# Patient Record
Sex: FEMALE | Race: WHITE | Employment: UNEMPLOYED | URBAN - METROPOLITAN AREA
[De-identification: names, ages, dates, MRNs, and addresses within clinical notes are randomized per-mention and may not be internally consistent; named-entity substitution may affect disease eponyms.]

---

## 2018-04-02 ENCOUNTER — OFFICE VISIT (OUTPATIENT)
Dept: FAMILY MEDICINE CLINIC | Age: 23
End: 2018-04-02
Payer: COMMERCIAL

## 2018-04-02 ENCOUNTER — HOSPITAL ENCOUNTER (OUTPATIENT)
Age: 23
Discharge: HOME | End: 2018-04-04
Payer: COMMERCIAL

## 2018-04-02 VITALS
DIASTOLIC BLOOD PRESSURE: 60 MMHG | HEART RATE: 101 BPM | OXYGEN SATURATION: 97 % | BODY MASS INDEX: 22.84 KG/M2 | TEMPERATURE: 98.4 F | RESPIRATION RATE: 16 BRPM | WEIGHT: 131 LBS | SYSTOLIC BLOOD PRESSURE: 120 MMHG

## 2018-04-02 DIAGNOSIS — R53.83 FATIGUE, UNSPECIFIED TYPE: ICD-10-CM

## 2018-04-02 DIAGNOSIS — J30.89 CHRONIC NON-SEASONAL ALLERGIC RHINITIS, UNSPECIFIED TRIGGER: ICD-10-CM

## 2018-04-02 DIAGNOSIS — R07.0 THROAT PAIN: Primary | ICD-10-CM

## 2018-04-02 LAB — S PYO AG THROAT QL: NORMAL

## 2018-04-02 PROCEDURE — 84443 ASSAY THYROID STIM HORMONE: CPT

## 2018-04-02 PROCEDURE — 87880 STREP A ASSAY W/OPTIC: CPT | Performed by: FAMILY MEDICINE

## 2018-04-02 PROCEDURE — 36415 COLL VENOUS BLD VENIPUNCTURE: CPT | Performed by: FAMILY MEDICINE

## 2018-04-02 PROCEDURE — 99213 OFFICE O/P EST LOW 20 MIN: CPT | Performed by: FAMILY MEDICINE

## 2018-04-02 PROCEDURE — G8420 CALC BMI NORM PARAMETERS: HCPCS | Performed by: FAMILY MEDICINE

## 2018-04-02 PROCEDURE — G8427 DOCREV CUR MEDS BY ELIG CLIN: HCPCS | Performed by: FAMILY MEDICINE

## 2018-04-02 PROCEDURE — 84439 ASSAY OF FREE THYROXINE: CPT

## 2018-04-02 PROCEDURE — 4004F PT TOBACCO SCREEN RCVD TLK: CPT | Performed by: FAMILY MEDICINE

## 2018-04-02 RX ORDER — FLUTICASONE PROPIONATE 50 MCG
1 SPRAY, SUSPENSION (ML) NASAL DAILY
Qty: 1 BOTTLE | Refills: 3 | Status: SHIPPED | OUTPATIENT
Start: 2018-04-02 | End: 2018-12-27 | Stop reason: ALTCHOICE

## 2018-04-02 RX ORDER — CETIRIZINE HYDROCHLORIDE 10 MG/1
10 TABLET ORAL DAILY
COMMUNITY
End: 2018-04-02 | Stop reason: SDUPTHER

## 2018-04-02 RX ORDER — CETIRIZINE HYDROCHLORIDE 10 MG/1
10 TABLET ORAL DAILY
Qty: 30 TABLET | Refills: 2 | Status: SHIPPED | OUTPATIENT
Start: 2018-04-02 | End: 2018-07-12

## 2018-04-02 ASSESSMENT — ENCOUNTER SYMPTOMS
ABDOMINAL PAIN: 0
DIARRHEA: 0
SORE THROAT: 1
SINUS PRESSURE: 0
WHEEZING: 0
SHORTNESS OF BREATH: 0
VOMITING: 1
RHINORRHEA: 1
NAUSEA: 1
BACK PAIN: 0
COUGH: 1
CONSTIPATION: 0

## 2018-04-03 LAB
T4 FREE: 1.2 NG/DL (ref 0.93–1.7)
TSH SERPL DL<=0.05 MIU/L-ACNC: 0.8 UIU/ML (ref 0.27–4.2)

## 2018-04-04 ENCOUNTER — TELEPHONE (OUTPATIENT)
Dept: FAMILY MEDICINE CLINIC | Age: 23
End: 2018-04-04

## 2018-04-04 RX ORDER — DIPHENHYDRAMINE HCL 25 MG
25 CAPSULE ORAL EVERY 4 HOURS PRN
Qty: 30 CAPSULE | Refills: 0 | Status: SHIPPED | OUTPATIENT
Start: 2018-04-04 | End: 2018-04-14

## 2018-07-12 ENCOUNTER — OFFICE VISIT (OUTPATIENT)
Dept: FAMILY MEDICINE CLINIC | Age: 23
End: 2018-07-12
Payer: COMMERCIAL

## 2018-07-12 VITALS
OXYGEN SATURATION: 98 % | SYSTOLIC BLOOD PRESSURE: 106 MMHG | HEIGHT: 64 IN | DIASTOLIC BLOOD PRESSURE: 64 MMHG | HEART RATE: 85 BPM | WEIGHT: 139 LBS | BODY MASS INDEX: 23.73 KG/M2

## 2018-07-12 DIAGNOSIS — F41.0 SEVERE ANXIETY WITH PANIC: Primary | ICD-10-CM

## 2018-07-12 PROCEDURE — G8427 DOCREV CUR MEDS BY ELIG CLIN: HCPCS | Performed by: FAMILY MEDICINE

## 2018-07-12 PROCEDURE — 99214 OFFICE O/P EST MOD 30 MIN: CPT | Performed by: FAMILY MEDICINE

## 2018-07-12 PROCEDURE — G8420 CALC BMI NORM PARAMETERS: HCPCS | Performed by: FAMILY MEDICINE

## 2018-07-12 PROCEDURE — 1036F TOBACCO NON-USER: CPT | Performed by: FAMILY MEDICINE

## 2018-07-12 RX ORDER — BUSPIRONE HYDROCHLORIDE 5 MG/1
5 TABLET ORAL 2 TIMES DAILY
Qty: 60 TABLET | Refills: 1 | Status: SHIPPED | OUTPATIENT
Start: 2018-07-12 | End: 2018-07-25 | Stop reason: SDUPTHER

## 2018-07-12 RX ORDER — NORGESTREL-ETHINYL ESTRADIOL 0.3-0.03MG
1 TABLET ORAL DAILY
Refills: 11 | COMMUNITY
Start: 2018-06-21 | End: 2018-12-27 | Stop reason: ALTCHOICE

## 2018-07-12 ASSESSMENT — ENCOUNTER SYMPTOMS
DIARRHEA: 0
VOMITING: 0
ABDOMINAL PAIN: 0
CONSTIPATION: 0
SORE THROAT: 0
SHORTNESS OF BREATH: 0
WHEEZING: 0
BACK PAIN: 0
RHINORRHEA: 0
SINUS PRESSURE: 0
NAUSEA: 0
COUGH: 0

## 2018-07-12 NOTE — PATIENT INSTRUCTIONS
Patient Education          buspirone  Pronunciation:  william SANTOSCOURTNEY urias  Brand: BuSpar  What is the most important information I should know about buspirone? Do not use buspirone if you have taken an MAO inhibitor in the past 14 days. A dangerous drug interaction could occur. MAO inhibitors include isocarboxazid, linezolid, methylene blue injection, phenelzine, rasagiline, selegiline, and tranylcypromine. What is buspirone? Buspirone is an anti-anxiety medicine that affects chemicals in the brain that may be unbalanced in people with anxiety. Buspirone is used to treat symptoms of anxiety, such as fear, tension, irritability, dizziness, pounding heartbeat, and other physical symptoms. Buspirone is not an anti-psychotic medication and should not be used in place of medication prescribed by your doctor for mental illness. Buspirone may also be used for purposes not listed in this medication guide. What should I discuss with my healthcare provider before taking buspirone? You should not use buspirone if you are allergic to it. Do not use buspirone if you have taken an MAO inhibitor in the past 14 days. A dangerous drug interaction could occur. MAO inhibitors include isocarboxazid, linezolid, methylene blue injection, phenelzine, rasagiline, selegiline, and tranylcypromine. To make sure buspirone is safe for you, tell your doctor if you have any of these conditions:  · kidney disease; or  · liver disease. Buspirone is not expected to harm an unborn baby. Tell your doctor if you are pregnant or plan to become pregnant during treatment. It is not known whether buspirone passes into breast milk or if it could harm a nursing baby. Tell your doctor if you are breast-feeding a baby. Buspirone is not approved for use by anyone younger than 25years old. How should I take buspirone? Follow all directions on your prescription label. Your doctor may occasionally change your dose to make sure you get the best results. Do not take this medicine in larger or smaller amounts or for longer than recommended. You may take buspirone with or without food but take it the same way each time. Some buspirone tablets are scored so you can break the tablet into 2 or 3 pieces in order to take a smaller amount of the medicine at each dose. Do not use a buspirone tablet if it has not been broken correctly and the piece is too big or too small. Follow your doctor's instructions about how much of the tablet to take. If you have switched to buspirone from another anxiety medication, you may need to slowly decrease your dose of the other medication rather than stopping suddenly. Some anxiety medications can cause withdrawal symptoms when you stop taking them suddenly after long-term use. Buspirone can cause false positive results with certain medical tests. You may need to stop using the medicine for at least 48 hours before your test. Tell any doctor who treats you that you are using buspirone. Store at room temperature away from moisture, heat, and light. What happens if I miss a dose? Take the missed dose as soon as you remember. Skip the missed dose if it is almost time for your next scheduled dose. Do not take extra medicine to make up the missed dose. What happens if I overdose? Seek emergency medical attention or call the Poison Help line at 1-951.287.5033. What should I avoid while taking buspirone? This medication may impair your thinking or reactions. Be careful if you drive or do anything that requires you to be alert. Drinking alcohol may increase certain side effects of buspirone. Grapefruit and grapefruit juice may interact with buspirone and lead to unwanted side effects. Discuss the use of grapefruit products with your doctor. What are the possible side effects of buspirone?   Get emergency medical help if you have signs of an allergic reaction: hives; difficult breathing; swelling of your face, lips, tongue, or
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
## 2018-07-25 ENCOUNTER — OFFICE VISIT (OUTPATIENT)
Dept: FAMILY MEDICINE CLINIC | Age: 23
End: 2018-07-25
Payer: COMMERCIAL

## 2018-07-25 VITALS
HEART RATE: 86 BPM | WEIGHT: 134.8 LBS | SYSTOLIC BLOOD PRESSURE: 98 MMHG | HEIGHT: 64 IN | TEMPERATURE: 98.1 F | OXYGEN SATURATION: 98 % | DIASTOLIC BLOOD PRESSURE: 64 MMHG | BODY MASS INDEX: 23.01 KG/M2

## 2018-07-25 DIAGNOSIS — J30.81 ACUTE ALLERGIC RHINITIS DUE TO ANIMAL HAIR AND DANDER: ICD-10-CM

## 2018-07-25 DIAGNOSIS — F41.0 SEVERE ANXIETY WITH PANIC: Primary | ICD-10-CM

## 2018-07-25 PROCEDURE — G8427 DOCREV CUR MEDS BY ELIG CLIN: HCPCS | Performed by: FAMILY MEDICINE

## 2018-07-25 PROCEDURE — 99214 OFFICE O/P EST MOD 30 MIN: CPT | Performed by: FAMILY MEDICINE

## 2018-07-25 PROCEDURE — 1036F TOBACCO NON-USER: CPT | Performed by: FAMILY MEDICINE

## 2018-07-25 PROCEDURE — G8420 CALC BMI NORM PARAMETERS: HCPCS | Performed by: FAMILY MEDICINE

## 2018-07-25 RX ORDER — BUSPIRONE HYDROCHLORIDE 7.5 MG/1
7.5 TABLET ORAL 2 TIMES DAILY
Qty: 60 TABLET | Refills: 1 | Status: SHIPPED | OUTPATIENT
Start: 2018-07-25 | End: 2018-08-15 | Stop reason: SDUPTHER

## 2018-07-25 RX ORDER — FEXOFENADINE HYDROCHLORIDE 60 MG/1
60 TABLET, FILM COATED ORAL DAILY
Qty: 30 TABLET | Refills: 1 | Status: SHIPPED | OUTPATIENT
Start: 2018-07-25 | End: 2018-08-15 | Stop reason: SDUPTHER

## 2018-07-25 RX ORDER — DIPHENHYDRAMINE HCL 25 MG
25 CAPSULE ORAL EVERY 4 HOURS PRN
Qty: 60 CAPSULE | Refills: 1 | Status: SHIPPED | OUTPATIENT
Start: 2018-07-25 | End: 2018-12-27 | Stop reason: ALTCHOICE

## 2018-07-25 ASSESSMENT — ENCOUNTER SYMPTOMS
NAUSEA: 0
DIARRHEA: 0
COUGH: 0
SHORTNESS OF BREATH: 0
SINUS PRESSURE: 0
WHEEZING: 0
BACK PAIN: 0
VOMITING: 0
SORE THROAT: 1
CONSTIPATION: 0
ABDOMINAL PAIN: 0
RHINORRHEA: 1

## 2018-07-25 NOTE — PATIENT INSTRUCTIONS
Patient Education          fexofenadine  Pronunciation:  FEX oh ARCHIE garner  Brand:  Allegra, Allegra 24 Hour Allergy, Allegra Allergy, Allegra ODT, Aller-Ease, Children's Allegra Allergy, Children's Allegra ODT  What is the most important information I should know about fexofenadine? Ask a doctor or pharmacist before taking this medicine if you are allergic to any drugs, or if you have kidney disease. Do not take fexofenadine with fruit juice (such as apple, orange, or grapefruit). Take the disintegrating tablet on an empty stomach, at least 1 hour before or 2 hours after a meal.  There may be other drugs that can affect fexofenadine. Tell your doctor about all medications you use. This includes prescription, over-the-counter, vitamin, and herbal products. Do not start a new medication without telling your doctor. Stop using this medication and get emergency medical help if you think you have used too much medicine, or if you have any of these signs of an allergic reaction: hives; difficulty breathing; swelling of your face, lips, tongue, or throat. Tell your doctor if your symptoms do not improve, or if they get worse after you start taking fexofenadine. Never use more than the recommended dose. What is fexofenadine? Fexofenadine is an antihistamine that reduces the effects of natural chemical histamine in the body. Histamine can produce symptoms of sneezing, itching, watery eyes, and runny nose. Fexofenadine is used to treat the symptoms of seasonal allergies (hay fever) in adults and children. Fexofenadine is also used to treat skin itching and hives caused by a condition called chronic idiopathic urticaria in adults and children. Fexofenadine may also be used for purposes not listed in this medication guide. What should I discuss with my healthcare provider before taking fexofenadine?   Ask a doctor or pharmacist if it is safe for you to take this medicine if you are allergic to any drugs, or if you practitioners. The absence of a warning for a given drug or drug combination in no way should be construed to indicate that the drug or drug combination is safe, effective or appropriate for any given patient. [facility] does not assume any responsibility for any aspect of healthcare administered with the aid of information [facility] provides. The information contained herein is not intended to cover all possible uses, directions, precautions, warnings, drug interactions, allergic reactions, or adverse effects. If you have questions about the drugs you are taking, check with your doctor, nurse or pharmacist.  Copyright 5368-0710 [address]: 10.02. Revision date: 2/22/2012. Care instructions adapted under license by [facility] ([facility]). If you have questions about a medical condition or this instruction, always ask your healthcare professional. [place] [address] any warranty or liability for your use of this information.

## 2018-07-25 NOTE — PROGRESS NOTES
Patient is a 24 y.o. female presenting today for follow up of anxiety. Anxiety has gotten a little bit better. She is buspar 5 mg BID. She follows with psychiatry on Monday. She is unsure if she is seeing the psychiatrist or a counselor on Monday. She states that her throat has been feeling like it is itchy and swelling. She is unsure if it is from the buspar or her cats. She states that this is only occurring when she is staying at her house. She has been waking up with a really stuffy nose. She is taking flonase as prescribed. She states that she has always had cats and never had a problem. Patient's past medical, surgical, social and/or family history reviewed, updated in chart, and are non-contributory (unless otherwise stated). Medications and allergies also reviewed and updated in chart. BP 98/64   Pulse 86   Temp 98.1 °F (36.7 °C) (Oral)   Ht 5' 3.5\" (1.613 m)   Wt 134 lb 12.8 oz (61.1 kg)   LMP 07/18/2018   SpO2 98%   Breastfeeding? No   BMI 23.50 kg/m²     Review of Systems   Constitutional: Negative for chills, fatigue and fever. HENT: Positive for congestion, rhinorrhea and sore throat. Negative for ear discharge, ear pain, postnasal drip, sinus pressure and sneezing. Respiratory: Negative for cough, shortness of breath and wheezing. Cardiovascular: Negative for chest pain, palpitations and leg swelling. Gastrointestinal: Negative for abdominal pain, constipation, diarrhea, nausea and vomiting. Genitourinary: Negative for dysuria, frequency and hematuria. Musculoskeletal: Negative for arthralgias, back pain and myalgias. Skin: Negative for rash. Neurological: Negative for dizziness, light-headedness and headaches. Psychiatric/Behavioral: Positive for agitation and sleep disturbance. Negative for self-injury and suicidal ideas. The patient is nervous/anxious. Physical Exam   Constitutional: She is oriented to person, place, and time.  She appears and/or home exercises printed for patient's review and were included in patient instructions on his/her After Visit Summary and given to patient at the end of visit. Counseled regarding above diagnosis, including possible risks and complications,  especially if left uncontrolled. Counseled regarding the possible side effects, risks, benefits and alternatives to treatment; patient and/or guardian verbalizes understanding, agrees, feels comfortable with and wishes to proceed with above treatment plan. Advised patient to call with any new medication issues, and read all Rx info from pharmacy to assure aware of all possible risks and side effects of medication before taking. Reviewed age and gender appropriate health screening exams and vaccinations. Advised patient regarding importance of keeping up with recommended health maintenance and to schedule as soon as possible if overdue, as this is important in assessing for undiagnosed pathology, especially cancer, as well as protecting against potentially harmful/life threatening disease. Patient and/or guardian verbalizes understanding and agrees with above counseling, assessment and plan. All questions answered.

## 2018-08-15 ENCOUNTER — OFFICE VISIT (OUTPATIENT)
Dept: FAMILY MEDICINE CLINIC | Age: 23
End: 2018-08-15
Payer: COMMERCIAL

## 2018-08-15 VITALS
DIASTOLIC BLOOD PRESSURE: 64 MMHG | HEIGHT: 64 IN | HEART RATE: 92 BPM | OXYGEN SATURATION: 98 % | BODY MASS INDEX: 23.93 KG/M2 | SYSTOLIC BLOOD PRESSURE: 106 MMHG | WEIGHT: 140.2 LBS

## 2018-08-15 DIAGNOSIS — F41.0 SEVERE ANXIETY WITH PANIC: ICD-10-CM

## 2018-08-15 DIAGNOSIS — J30.81 ACUTE ALLERGIC RHINITIS DUE TO ANIMAL HAIR AND DANDER: Primary | ICD-10-CM

## 2018-08-15 PROCEDURE — 1036F TOBACCO NON-USER: CPT | Performed by: FAMILY MEDICINE

## 2018-08-15 PROCEDURE — 99213 OFFICE O/P EST LOW 20 MIN: CPT | Performed by: FAMILY MEDICINE

## 2018-08-15 PROCEDURE — G8427 DOCREV CUR MEDS BY ELIG CLIN: HCPCS | Performed by: FAMILY MEDICINE

## 2018-08-15 PROCEDURE — G8420 CALC BMI NORM PARAMETERS: HCPCS | Performed by: FAMILY MEDICINE

## 2018-08-15 RX ORDER — BUSPIRONE HYDROCHLORIDE 7.5 MG/1
7.5 TABLET ORAL 2 TIMES DAILY
Qty: 60 TABLET | Refills: 3 | Status: SHIPPED | OUTPATIENT
Start: 2018-08-15 | End: 2019-01-03 | Stop reason: SDUPTHER

## 2018-08-15 RX ORDER — FEXOFENADINE HCL 180 MG/1
180 TABLET ORAL DAILY
Qty: 30 TABLET | Refills: 1 | Status: SHIPPED | OUTPATIENT
Start: 2018-08-15 | End: 2018-12-27 | Stop reason: ALTCHOICE

## 2018-08-15 ASSESSMENT — ENCOUNTER SYMPTOMS
NAUSEA: 0
ABDOMINAL PAIN: 0
DIARRHEA: 0
SORE THROAT: 0
SHORTNESS OF BREATH: 0
BACK PAIN: 0
CONSTIPATION: 0
RHINORRHEA: 0
SINUS PRESSURE: 0
WHEEZING: 0
COUGH: 0
VOMITING: 0

## 2018-08-15 NOTE — PATIENT INSTRUCTIONS
have kidney disease. FDA pregnancy category C. It is not known whether fexofenadine will harm an unborn baby. Tell your doctor if you are pregnant or plan to become pregnant while using this medication. It is not known whether fexofenadine passes into breast milk or if it could harm a nursing baby. Do not use this medication without telling your doctor if you are breast-feeding a baby. Fexofenadine tablets  and capsules  may be used to treat seasonal allergy symptoms in children who are at least 10years old. Fexofenadine oral suspension (liquid) may be used in children ages 2 through 6. When treating chronic idiopathic urticaria, the liquid may be used in children as young as 2 months old. Do not give this medicine to a child without medical advice. How should I take fexofenadine? Use exactly as directed on the label, or as prescribed by your doctor. Do not use in larger or smaller amounts or for longer than recommended. Take the regular tablet  with a full glass of water. Do not use any other type of liquid. Shake the oral suspension (liquid) well just before you measure a dose. To be sure you get the correct dose, measure the liquid with a marked measuring spoon or medicine cup, not with a regular table spoon. If you do not have a dose-measuring device, ask your pharmacist for one. To take fexofenadine orally disintegrating tablets (Allegra ODT):  · Keep the tablet in its blister pack until you are ready to take the medicine. Open the package and peel back the foil from the tablet blister. Do not push a tablet through the foil or you may damage the tablet. · Using dry hands, remove the tablet and place it in your mouth. It will begin to dissolve right away. · Do not swallow the tablet whole. Allow it to dissolve in your mouth without chewing. · Swallow several times as the tablet dissolves. If desired, you may drink liquid to help swallow the dissolved tablet.   Take the disintegrating tablet on an

## 2018-08-15 NOTE — PROGRESS NOTES
Patient is a 24 y.o. female presenting today for follow up of allergies. She states that she is still congested but it is more tolerable than it was before. They have not gotten rid of all of the cats. She is taking allegra as prescribed. She is going to take the cats to a farm. Anxiety: anxiety is also getting better. She saw a counselor and they want her to start doing exposure therapy where she makes herself drive more. She is taking buspar 5 mg BID. IT does seem to be helping. Patient's past medical, surgical, social and/or family history reviewed, updated in chart, and are non-contributory (unless otherwise stated). Medications and allergies also reviewed and updated in chart. /64 (Site: Right Arm, Position: Sitting, Cuff Size: Medium Adult)   Pulse 92   Ht 5' 3.5\" (1.613 m)   Wt 140 lb 3.2 oz (63.6 kg)   LMP 08/15/2018   SpO2 98%   BMI 24.45 kg/m²     Review of Systems   Constitutional: Negative for chills, fatigue and fever. HENT: Positive for congestion. Negative for ear discharge, ear pain, postnasal drip, rhinorrhea, sinus pressure, sneezing and sore throat. Respiratory: Negative for cough, shortness of breath and wheezing. Cardiovascular: Negative for chest pain, palpitations and leg swelling. Gastrointestinal: Negative for abdominal pain, constipation, diarrhea, nausea and vomiting. Genitourinary: Negative for dysuria, frequency and hematuria. Musculoskeletal: Negative for arthralgias, back pain and myalgias. Skin: Negative for rash. Neurological: Negative for dizziness, light-headedness and headaches. Psychiatric/Behavioral: The patient is nervous/anxious. Physical Exam   Constitutional: She is oriented to person, place, and time. She appears well-developed and well-nourished. HENT:   Head: Normocephalic and atraumatic.    Right Ear: External ear normal.   Left Ear: External ear normal.   Nose: Nose normal.   Mouth/Throat: Oropharynx is issues, and read all Rx info from pharmacy to assure aware of all possible risks and side effects of medication before taking. Reviewed age and gender appropriate health screening exams and vaccinations. Advised patient regarding importance of keeping up with recommended health maintenance and to schedule as soon as possible if overdue, as this is important in assessing for undiagnosed pathology, especially cancer, as well as protecting against potentially harmful/life threatening disease. Patient and/or guardian verbalizes understanding and agrees with above counseling, assessment and plan. All questions answered.

## 2018-10-29 ENCOUNTER — HOSPITAL ENCOUNTER (EMERGENCY)
Age: 23
Discharge: HOME | End: 2018-10-29
Attending: EMERGENCY MEDICINE
Payer: COMMERCIAL

## 2018-10-29 VITALS
WEIGHT: 140 LBS | RESPIRATION RATE: 16 BRPM | SYSTOLIC BLOOD PRESSURE: 109 MMHG | BODY MASS INDEX: 23.9 KG/M2 | HEIGHT: 64 IN | DIASTOLIC BLOOD PRESSURE: 59 MMHG | TEMPERATURE: 99.4 F | OXYGEN SATURATION: 99 % | HEART RATE: 94 BPM

## 2018-10-29 DIAGNOSIS — R10.9 LEFT FLANK PAIN: ICD-10-CM

## 2018-10-29 DIAGNOSIS — N12 PYELONEPHRITIS: Primary | ICD-10-CM

## 2018-10-29 LAB
ALBUMIN SERPL-MCNC: 4.5 G/DL (ref 3.5–5.2)
ALP BLD-CCNC: 69 U/L (ref 35–104)
ALT SERPL-CCNC: 21 U/L (ref 0–32)
ANION GAP SERPL CALCULATED.3IONS-SCNC: 14 MMOL/L (ref 7–16)
AST SERPL-CCNC: 24 U/L (ref 0–31)
BACTERIA: ABNORMAL /HPF
BASOPHILS ABSOLUTE: 0.02 E9/L (ref 0–0.2)
BASOPHILS RELATIVE PERCENT: 0.2 % (ref 0–2)
BILIRUB SERPL-MCNC: 0.3 MG/DL (ref 0–1.2)
BILIRUBIN URINE: NEGATIVE
BLOOD, URINE: ABNORMAL
BUN BLDV-MCNC: 7 MG/DL (ref 6–20)
CALCIUM SERPL-MCNC: 9.1 MG/DL (ref 8.6–10.2)
CHLORIDE BLD-SCNC: 100 MMOL/L (ref 98–107)
CHP ED QC CHECK: YES
CLARITY: ABNORMAL
CO2: 21 MMOL/L (ref 22–29)
COLOR: YELLOW
CREAT SERPL-MCNC: 0.7 MG/DL (ref 0.5–1)
EOSINOPHILS ABSOLUTE: 0.05 E9/L (ref 0.05–0.5)
EOSINOPHILS RELATIVE PERCENT: 0.6 % (ref 0–6)
GFR AFRICAN AMERICAN: >60
GFR NON-AFRICAN AMERICAN: >60 ML/MIN/1.73
GLUCOSE BLD-MCNC: 94 MG/DL (ref 74–109)
GLUCOSE URINE: NEGATIVE MG/DL
HCT VFR BLD CALC: 36 % (ref 34–48)
HEMOGLOBIN: 12.4 G/DL (ref 11.5–15.5)
IMMATURE GRANULOCYTES #: 0.02 E9/L
IMMATURE GRANULOCYTES %: 0.2 % (ref 0–5)
KETONES, URINE: NEGATIVE MG/DL
LACTIC ACID: 0.9 MMOL/L (ref 0.5–2.2)
LEUKOCYTE ESTERASE, URINE: ABNORMAL
LIPASE: 19 U/L (ref 13–60)
LYMPHOCYTES ABSOLUTE: 1.91 E9/L (ref 1.5–4)
LYMPHOCYTES RELATIVE PERCENT: 22.9 % (ref 20–42)
MCH RBC QN AUTO: 28.4 PG (ref 26–35)
MCHC RBC AUTO-ENTMCNC: 34.4 % (ref 32–34.5)
MCV RBC AUTO: 82.4 FL (ref 80–99.9)
MONOCYTES ABSOLUTE: 0.39 E9/L (ref 0.1–0.95)
MONOCYTES RELATIVE PERCENT: 4.7 % (ref 2–12)
NEUTROPHILS ABSOLUTE: 5.95 E9/L (ref 1.8–7.3)
NEUTROPHILS RELATIVE PERCENT: 71.4 % (ref 43–80)
NITRITE, URINE: POSITIVE
PDW BLD-RTO: 13.1 FL (ref 11.5–15)
PH UA: 6 (ref 5–9)
PLATELET # BLD: 198 E9/L (ref 130–450)
PMV BLD AUTO: 9.6 FL (ref 7–12)
POTASSIUM SERPL-SCNC: 4.1 MMOL/L (ref 3.5–5)
PREGNANCY TEST URINE, POC: NEGATIVE
PROTEIN UA: ABNORMAL MG/DL
RBC # BLD: 4.37 E12/L (ref 3.5–5.5)
RBC UA: ABNORMAL /HPF (ref 0–2)
SODIUM BLD-SCNC: 135 MMOL/L (ref 132–146)
SPECIFIC GRAVITY UA: 1.02 (ref 1–1.03)
TOTAL PROTEIN: 7.6 G/DL (ref 6.4–8.3)
UROBILINOGEN, URINE: 0.2 E.U./DL
WBC # BLD: 8.3 E9/L (ref 4.5–11.5)
WBC UA: ABNORMAL /HPF (ref 0–5)

## 2018-10-29 PROCEDURE — 99284 EMERGENCY DEPT VISIT MOD MDM: CPT

## 2018-10-29 PROCEDURE — 81001 URINALYSIS AUTO W/SCOPE: CPT

## 2018-10-29 PROCEDURE — 87077 CULTURE AEROBIC IDENTIFY: CPT

## 2018-10-29 PROCEDURE — 80053 COMPREHEN METABOLIC PANEL: CPT

## 2018-10-29 PROCEDURE — 87040 BLOOD CULTURE FOR BACTERIA: CPT

## 2018-10-29 PROCEDURE — 96365 THER/PROPH/DIAG IV INF INIT: CPT

## 2018-10-29 PROCEDURE — 2580000003 HC RX 258: Performed by: EMERGENCY MEDICINE

## 2018-10-29 PROCEDURE — 83690 ASSAY OF LIPASE: CPT

## 2018-10-29 PROCEDURE — 87088 URINE BACTERIA CULTURE: CPT

## 2018-10-29 PROCEDURE — 87186 SC STD MICRODIL/AGAR DIL: CPT

## 2018-10-29 PROCEDURE — 6360000002 HC RX W HCPCS: Performed by: EMERGENCY MEDICINE

## 2018-10-29 PROCEDURE — 96375 TX/PRO/DX INJ NEW DRUG ADDON: CPT

## 2018-10-29 PROCEDURE — 85025 COMPLETE CBC W/AUTO DIFF WBC: CPT

## 2018-10-29 PROCEDURE — 83605 ASSAY OF LACTIC ACID: CPT

## 2018-10-29 RX ORDER — NAPROXEN 500 MG/1
500 TABLET ORAL 2 TIMES DAILY
Qty: 14 TABLET | Refills: 0 | Status: SHIPPED | OUTPATIENT
Start: 2018-10-29 | End: 2018-12-27 | Stop reason: ALTCHOICE

## 2018-10-29 RX ORDER — 0.9 % SODIUM CHLORIDE 0.9 %
1000 INTRAVENOUS SOLUTION INTRAVENOUS ONCE
Status: COMPLETED | OUTPATIENT
Start: 2018-10-29 | End: 2018-10-29

## 2018-10-29 RX ORDER — ONDANSETRON 2 MG/ML
8 INJECTION INTRAMUSCULAR; INTRAVENOUS ONCE
Status: COMPLETED | OUTPATIENT
Start: 2018-10-29 | End: 2018-10-29

## 2018-10-29 RX ORDER — HYDROCODONE BITARTRATE AND ACETAMINOPHEN 5; 325 MG/1; MG/1
1 TABLET ORAL EVERY 6 HOURS PRN
Qty: 10 TABLET | Refills: 0 | Status: SHIPPED | OUTPATIENT
Start: 2018-10-29 | End: 2018-11-01

## 2018-10-29 RX ORDER — CEFDINIR 300 MG/1
300 CAPSULE ORAL 2 TIMES DAILY
Qty: 20 CAPSULE | Refills: 0 | Status: SHIPPED | OUTPATIENT
Start: 2018-10-29 | End: 2018-11-08

## 2018-10-29 RX ORDER — SODIUM CHLORIDE 0.9 % (FLUSH) 0.9 %
10 SYRINGE (ML) INJECTION PRN
Status: DISCONTINUED | OUTPATIENT
Start: 2018-10-29 | End: 2018-10-29 | Stop reason: HOSPADM

## 2018-10-29 RX ORDER — KETOROLAC TROMETHAMINE 30 MG/ML
15 INJECTION, SOLUTION INTRAMUSCULAR; INTRAVENOUS ONCE
Status: COMPLETED | OUTPATIENT
Start: 2018-10-29 | End: 2018-10-29

## 2018-10-29 RX ADMIN — KETOROLAC TROMETHAMINE 15 MG: 30 INJECTION, SOLUTION INTRAMUSCULAR at 09:32

## 2018-10-29 RX ADMIN — SODIUM CHLORIDE 1000 ML: 9 INJECTION, SOLUTION INTRAVENOUS at 09:32

## 2018-10-29 RX ADMIN — CEFTRIAXONE SODIUM 2 G: 2 INJECTION, POWDER, FOR SOLUTION INTRAMUSCULAR; INTRAVENOUS at 09:32

## 2018-10-29 RX ADMIN — ONDANSETRON 8 MG: 2 INJECTION INTRAMUSCULAR; INTRAVENOUS at 09:32

## 2018-10-29 ASSESSMENT — PAIN SCALES - GENERAL: PAINLEVEL_OUTOF10: 10

## 2018-10-29 ASSESSMENT — PAIN DESCRIPTION - LOCATION: LOCATION: FLANK

## 2018-10-29 ASSESSMENT — ENCOUNTER SYMPTOMS
SHORTNESS OF BREATH: 0
VOMITING: 1
BACK PAIN: 0
COUGH: 0
BLOOD IN STOOL: 0
ABDOMINAL PAIN: 0
NAUSEA: 1

## 2018-10-29 ASSESSMENT — PAIN DESCRIPTION - PAIN TYPE: TYPE: ACUTE PAIN

## 2018-10-29 NOTE — ED PROVIDER NOTES
65-year-old female presents to the emergency department with dysuria and left flank pain that started approximately 2 weeks ago and has continued to worsen to the point today where she could not tolerate anymore. Patient states Dr. valverde who thought it might be a urinary tract infection. She states subjective fevers at home has also had some nausea and vomiting over the last 2 days. She denies any other symptoms including vaginal bleeding vaginal discharge chest pain shortness of breath or other symptoms. The history is provided by the patient. Dysuria    This is a new problem. The current episode started more than 1 week ago. The problem occurs every urination. The problem has not changed since onset. The quality of the pain is described as burning. The pain is at a severity of 6/10. The pain is moderate. There has been no fever. The fever has been present for less than 1 day. Associated symptoms include chills, nausea, vomiting, urgency and flank pain. Pertinent negatives include no frequency. She has tried nothing for the symptoms. Review of Systems   Constitutional: Positive for chills. Negative for fever. Respiratory: Negative for cough and shortness of breath. Cardiovascular: Negative for chest pain. Gastrointestinal: Positive for nausea and vomiting. Negative for abdominal pain and blood in stool. Genitourinary: Positive for dysuria, flank pain and urgency. Negative for frequency. Musculoskeletal: Negative for back pain. Skin: Negative for rash. Neurological: Negative for weakness and headaches. All other systems reviewed and are negative. Physical Exam   Constitutional: She is oriented to person, place, and time. She appears well-developed and well-nourished. She appears ill. HENT:   Head: Normocephalic and atraumatic. Eyes: Pupils are equal, round, and reactive to light. EOM are normal.   Neck: Normal range of motion. Neck supple. Cardiovascular: Regular rhythm.

## 2018-10-31 LAB
ORGANISM: ABNORMAL
URINE CULTURE, ROUTINE: ABNORMAL
URINE CULTURE, ROUTINE: ABNORMAL

## 2018-11-03 LAB
BLOOD CULTURE, ROUTINE: NORMAL
CULTURE, BLOOD 2: NORMAL

## 2018-12-15 ENCOUNTER — HOSPITAL ENCOUNTER (EMERGENCY)
Age: 23
Discharge: HOME | End: 2018-12-15
Attending: EMERGENCY MEDICINE
Payer: COMMERCIAL

## 2018-12-15 ENCOUNTER — APPOINTMENT (OUTPATIENT)
Dept: ULTRASOUND IMAGING | Age: 23
End: 2018-12-15
Payer: COMMERCIAL

## 2018-12-15 VITALS
HEIGHT: 63 IN | OXYGEN SATURATION: 100 % | HEART RATE: 92 BPM | TEMPERATURE: 98.8 F | BODY MASS INDEX: 24.8 KG/M2 | RESPIRATION RATE: 16 BRPM | WEIGHT: 140 LBS | DIASTOLIC BLOOD PRESSURE: 62 MMHG | SYSTOLIC BLOOD PRESSURE: 120 MMHG

## 2018-12-15 DIAGNOSIS — Z3A.01 LESS THAN 8 WEEKS GESTATION OF PREGNANCY: ICD-10-CM

## 2018-12-15 DIAGNOSIS — N89.8 VAGINAL DISCHARGE: Primary | ICD-10-CM

## 2018-12-15 LAB
BACTERIA: ABNORMAL /HPF
BASOPHILS ABSOLUTE: 0.04 E9/L (ref 0–0.2)
BASOPHILS RELATIVE PERCENT: 0.5 % (ref 0–2)
BILIRUBIN URINE: NEGATIVE
BLOOD, URINE: NEGATIVE
CLARITY: CLEAR
CLUE CELLS: NORMAL
COLOR: YELLOW
EOSINOPHILS ABSOLUTE: 0.12 E9/L (ref 0.05–0.5)
EOSINOPHILS RELATIVE PERCENT: 1.5 % (ref 0–6)
EPITHELIAL CELLS, UA: ABNORMAL /HPF
GLUCOSE URINE: NEGATIVE MG/DL
GONADOTROPIN, CHORIONIC (HCG) QUANT: ABNORMAL MIU/ML
HCT VFR BLD CALC: 34.8 % (ref 34–48)
HEMOGLOBIN: 11.8 G/DL (ref 11.5–15.5)
IMMATURE GRANULOCYTES #: 0.02 E9/L
IMMATURE GRANULOCYTES %: 0.3 % (ref 0–5)
KETONES, URINE: NEGATIVE MG/DL
LEUKOCYTE ESTERASE, URINE: ABNORMAL
LYMPHOCYTES ABSOLUTE: 3.08 E9/L (ref 1.5–4)
LYMPHOCYTES RELATIVE PERCENT: 38.8 % (ref 20–42)
MCH RBC QN AUTO: 28 PG (ref 26–35)
MCHC RBC AUTO-ENTMCNC: 33.9 % (ref 32–34.5)
MCV RBC AUTO: 82.7 FL (ref 80–99.9)
MONOCYTES ABSOLUTE: 0.29 E9/L (ref 0.1–0.95)
MONOCYTES RELATIVE PERCENT: 3.7 % (ref 2–12)
NEUTROPHILS ABSOLUTE: 4.39 E9/L (ref 1.8–7.3)
NEUTROPHILS RELATIVE PERCENT: 55.2 % (ref 43–80)
NITRITE, URINE: NEGATIVE
PDW BLD-RTO: 13.1 FL (ref 11.5–15)
PH UA: 7 (ref 5–9)
PLATELET # BLD: 220 E9/L (ref 130–450)
PMV BLD AUTO: 9.2 FL (ref 7–12)
PROTEIN UA: NEGATIVE MG/DL
RBC # BLD: 4.21 E12/L (ref 3.5–5.5)
RBC UA: ABNORMAL /HPF (ref 0–2)
SOURCE WET PREP: NORMAL
SPECIFIC GRAVITY UA: <=1.005 (ref 1–1.03)
TRICHOMONAS PREP: NORMAL
UROBILINOGEN, URINE: 0.2 E.U./DL
WBC # BLD: 7.9 E9/L (ref 4.5–11.5)
WBC UA: ABNORMAL /HPF (ref 0–5)
YEAST WET PREP: NORMAL

## 2018-12-15 PROCEDURE — 76817 TRANSVAGINAL US OBSTETRIC: CPT

## 2018-12-15 PROCEDURE — 87491 CHLMYD TRACH DNA AMP PROBE: CPT

## 2018-12-15 PROCEDURE — 84702 CHORIONIC GONADOTROPIN TEST: CPT

## 2018-12-15 PROCEDURE — 87210 SMEAR WET MOUNT SALINE/INK: CPT

## 2018-12-15 PROCEDURE — 87591 N.GONORRHOEAE DNA AMP PROB: CPT

## 2018-12-15 PROCEDURE — 81001 URINALYSIS AUTO W/SCOPE: CPT

## 2018-12-15 PROCEDURE — 99284 EMERGENCY DEPT VISIT MOD MDM: CPT

## 2018-12-15 PROCEDURE — 85025 COMPLETE CBC W/AUTO DIFF WBC: CPT

## 2018-12-15 ASSESSMENT — ENCOUNTER SYMPTOMS
ABDOMINAL PAIN: 0
VOMITING: 0
COUGH: 0
BACK PAIN: 0
NAUSEA: 0
SHORTNESS OF BREATH: 0
BLOOD IN STOOL: 0

## 2018-12-15 NOTE — ED PROVIDER NOTES
N. Gonorrhoeae Amplified       Negative for N. gonorrhoeae rRNA  Results should be interpreted in conjunction with other  laboratory and clinical data available to the clinician.      HCG, Quantitative, Pregnancy   Result Value Ref Range    hCG Quant 24413.0 (H) <10 mIU/mL   Urinalysis with Microscopic   Result Value Ref Range    Color, UA Yellow Straw/Yellow    Clarity, UA Clear Clear    Glucose, Ur Negative Negative mg/dL    Bilirubin Urine Negative Negative    Ketones, Urine Negative Negative mg/dL    Specific Gravity, UA <=1.005 1.005 - 1.030    Blood, Urine Negative Negative    pH, UA 7.0 5.0 - 9.0    Protein, UA Negative Negative mg/dL    Urobilinogen, Urine 0.2 <2.0 E.U./dL    Nitrite, Urine Negative Negative    Leukocyte Esterase, Urine MODERATE (A) Negative    WBC, UA 5-10 0 - 5 /HPF    RBC, UA 0-1 0 - 2 /HPF    Epi Cells MANY /HPF    Bacteria, UA MODERATE (A) /HPF   CBC Auto Differential   Result Value Ref Range    WBC 7.9 4.5 - 11.5 E9/L    RBC 4.21 3.50 - 5.50 E12/L    Hemoglobin 11.8 11.5 - 15.5 g/dL    Hematocrit 34.8 34.0 - 48.0 %    MCV 82.7 80.0 - 99.9 fL    MCH 28.0 26.0 - 35.0 pg    MCHC 33.9 32.0 - 34.5 %    RDW 13.1 11.5 - 15.0 fL    Platelets 670 781 - 505 E9/L    MPV 9.2 7.0 - 12.0 fL    Neutrophils % 55.2 43.0 - 80.0 %    Immature Granulocytes % 0.3 0.0 - 5.0 %    Lymphocytes % 38.8 20.0 - 42.0 %    Monocytes % 3.7 2.0 - 12.0 %    Eosinophils % 1.5 0.0 - 6.0 %    Basophils % 0.5 0.0 - 2.0 %    Neutrophils # 4.39 1.80 - 7.30 E9/L    Immature Granulocytes # 0.02 E9/L    Lymphocytes # 3.08 1.50 - 4.00 E9/L    Monocytes # 0.29 0.10 - 0.95 E9/L    Eosinophils # 0.12 0.05 - 0.50 E9/L    Basophils # 0.04 0.00 - 0.20 E9/L       Radiology:  US OB TRANSVAGINAL   Final Result   Very early single living intrauterine gestation present best estimate   gestational age 10 weeks 6 days JOSELYN 8/11/2019             ------------------------- NURSING NOTES AND VITALS REVIEWED ---------------------------  Date / Time Roomed:  12/15/2018  6:16 PM  ED Bed Assignment:  12/12    The nursing notes within the ED encounter and vital signs as below have been reviewed. /62   Pulse 92   Temp 98.8 °F (37.1 °C)   Resp 16   Ht 5' 3\" (1.6 m)   Wt 140 lb (63.5 kg)   LMP 10/01/2018   SpO2 100%   BMI 24.80 kg/m²   Oxygen Saturation Interpretation: Normal      ------------------------------------------ PROGRESS NOTES ------------------------------------------  I have spoken with the patient and discussed todays results, in addition to providing specific details for the plan of care and counseling regarding the diagnosis and prognosis. Their questions are answered at this time and they are agreeable with the plan. I discussed at length with them reasons for immediate return here for re evaluation. They will followup with primary care by calling their office tomorrow. --------------------------------- ADDITIONAL PROVIDER NOTES ---------------------------------  At this time the patient is without objective evidence of an acute process requiring hospitalization or inpatient management. They have remained hemodynamically stable throughout their entire ED visit and are stable for discharge with outpatient follow-up. The plan has been discussed in detail and they are aware of the specific conditions for emergent return, as well as the importance of follow-up. Discharge Medication List as of 12/15/2018  9:12 PM          Diagnosis:  1. Vaginal discharge    2. Less than 8 weeks gestation of pregnancy        Disposition:  Patient's disposition: Discharge to home  Patient's condition is stable.             Antony Luther,   Resident  12/19/18 [id number]

## 2018-12-19 LAB
CHLAMYDIA TRACHOMATIS AMPLIFIED DET: NORMAL
N GONORRHOEAE AMPLIFIED DET: NORMAL

## 2019-01-03 ENCOUNTER — OFFICE VISIT (OUTPATIENT)
Dept: FAMILY MEDICINE CLINIC | Age: 24
End: 2019-01-03
Payer: COMMERCIAL

## 2019-01-03 VITALS
SYSTOLIC BLOOD PRESSURE: 118 MMHG | WEIGHT: 145.6 LBS | DIASTOLIC BLOOD PRESSURE: 74 MMHG | TEMPERATURE: 98.2 F | OXYGEN SATURATION: 98 % | HEART RATE: 84 BPM | BODY MASS INDEX: 24.86 KG/M2 | HEIGHT: 64 IN

## 2019-01-03 DIAGNOSIS — Z23 NEEDS FLU SHOT: ICD-10-CM

## 2019-01-03 DIAGNOSIS — F41.0 SEVERE ANXIETY WITH PANIC: Primary | ICD-10-CM

## 2019-01-03 PROCEDURE — 1036F TOBACCO NON-USER: CPT | Performed by: FAMILY MEDICINE

## 2019-01-03 PROCEDURE — 90688 IIV4 VACCINE SPLT 0.5 ML IM: CPT | Performed by: FAMILY MEDICINE

## 2019-01-03 PROCEDURE — G8482 FLU IMMUNIZE ORDER/ADMIN: HCPCS | Performed by: FAMILY MEDICINE

## 2019-01-03 PROCEDURE — 99213 OFFICE O/P EST LOW 20 MIN: CPT | Performed by: FAMILY MEDICINE

## 2019-01-03 PROCEDURE — 90471 IMMUNIZATION ADMIN: CPT | Performed by: FAMILY MEDICINE

## 2019-01-03 PROCEDURE — G8427 DOCREV CUR MEDS BY ELIG CLIN: HCPCS | Performed by: FAMILY MEDICINE

## 2019-01-03 PROCEDURE — G8419 CALC BMI OUT NRM PARAM NOF/U: HCPCS | Performed by: FAMILY MEDICINE

## 2019-01-03 RX ORDER — ASPIRIN 81 MG/1
81 TABLET, CHEWABLE ORAL DAILY
COMMUNITY
End: 2019-07-11 | Stop reason: ALTCHOICE

## 2019-01-03 RX ORDER — BUSPIRONE HYDROCHLORIDE 7.5 MG/1
7.5 TABLET ORAL 2 TIMES DAILY
Qty: 60 TABLET | Refills: 3 | Status: SHIPPED
Start: 2019-01-03 | End: 2020-06-10 | Stop reason: SDUPTHER

## 2019-01-03 ASSESSMENT — ENCOUNTER SYMPTOMS
ABDOMINAL PAIN: 0
CONSTIPATION: 0
SHORTNESS OF BREATH: 0
COUGH: 0
SORE THROAT: 0
RHINORRHEA: 0
SINUS PRESSURE: 0
NAUSEA: 1
DIARRHEA: 0
BACK PAIN: 0
WHEEZING: 0
VOMITING: 1

## 2019-01-24 ENCOUNTER — ROUTINE PRENATAL (OUTPATIENT)
Dept: OBGYN CLINIC | Age: 24
End: 2019-01-24
Payer: COMMERCIAL

## 2019-01-24 VITALS
BODY MASS INDEX: 24.93 KG/M2 | WEIGHT: 143 LBS | SYSTOLIC BLOOD PRESSURE: 114 MMHG | HEART RATE: 96 BPM | DIASTOLIC BLOOD PRESSURE: 74 MMHG

## 2019-01-24 DIAGNOSIS — Z13.79 ENCOUNTER FOR OTHER SCREENING FOR GENETIC AND CHROMOSOMAL ANOMALIES: ICD-10-CM

## 2019-01-24 DIAGNOSIS — O99.341 BIPOLAR DISEASE DURING PREGNANCY IN FIRST TRIMESTER (HCC): ICD-10-CM

## 2019-01-24 DIAGNOSIS — O99.111 BLOOD COAGULATION DISORDER COMPLICATING PREGNANCY, FIRST TRIMESTER (HCC): Primary | ICD-10-CM

## 2019-01-24 DIAGNOSIS — Z79.01 LONG TERM CURRENT USE OF ANTICOAGULANT THERAPY: ICD-10-CM

## 2019-01-24 DIAGNOSIS — O99.331 TOBACCO SMOKING AFFECTING PREGNANCY IN FIRST TRIMESTER: ICD-10-CM

## 2019-01-24 DIAGNOSIS — F31.9 BIPOLAR DISEASE DURING PREGNANCY IN FIRST TRIMESTER (HCC): ICD-10-CM

## 2019-01-24 DIAGNOSIS — O35.5XX0 SUSPECTED DAMAGE TO FETUS FROM DRUGS, AFFECTING MANAGEMENT OF MOTHER, SINGLE OR UNSPECIFIED FETUS: ICD-10-CM

## 2019-01-24 DIAGNOSIS — Z3A.12 12 WEEKS GESTATION OF PREGNANCY: ICD-10-CM

## 2019-01-24 DIAGNOSIS — D68.9 BLOOD COAGULATION DISORDER COMPLICATING PREGNANCY, FIRST TRIMESTER (HCC): Primary | ICD-10-CM

## 2019-01-24 DIAGNOSIS — O09.891 PRIOR PREGNANCY COMPLICATED BY PIH, ANTEPARTUM, FIRST TRIMESTER: ICD-10-CM

## 2019-01-24 LAB
GLUCOSE URINE, POC: ABNORMAL
PROTEIN UA: NEGATIVE

## 2019-01-24 PROCEDURE — G8420 CALC BMI NORM PARAMETERS: HCPCS | Performed by: OBSTETRICS & GYNECOLOGY

## 2019-01-24 PROCEDURE — G8427 DOCREV CUR MEDS BY ELIG CLIN: HCPCS | Performed by: OBSTETRICS & GYNECOLOGY

## 2019-01-24 PROCEDURE — 81002 URINALYSIS NONAUTO W/O SCOPE: CPT | Performed by: OBSTETRICS & GYNECOLOGY

## 2019-01-24 PROCEDURE — G8482 FLU IMMUNIZE ORDER/ADMIN: HCPCS | Performed by: OBSTETRICS & GYNECOLOGY

## 2019-01-24 PROCEDURE — 99201 HC NEW PT, E/M LEVEL 1: CPT | Performed by: OBSTETRICS & GYNECOLOGY

## 2019-01-24 PROCEDURE — 99242 OFF/OP CONSLTJ NEW/EST SF 20: CPT | Performed by: OBSTETRICS & GYNECOLOGY

## 2019-01-24 PROCEDURE — 76813 OB US NUCHAL MEAS 1 GEST: CPT | Performed by: OBSTETRICS & GYNECOLOGY

## 2019-01-24 RX ORDER — AMOXICILLIN 500 MG/1
CAPSULE ORAL
Refills: 0 | COMMUNITY
Start: 2019-01-10 | End: 2019-02-21

## 2019-02-01 ENCOUNTER — TELEPHONE (OUTPATIENT)
Dept: OBGYN CLINIC | Age: 24
End: 2019-02-01

## 2019-03-14 ENCOUNTER — ROUTINE PRENATAL (OUTPATIENT)
Dept: OBGYN CLINIC | Age: 24
End: 2019-03-14
Payer: COMMERCIAL

## 2019-03-14 VITALS
WEIGHT: 140 LBS | SYSTOLIC BLOOD PRESSURE: 111 MMHG | DIASTOLIC BLOOD PRESSURE: 77 MMHG | BODY MASS INDEX: 24.8 KG/M2 | HEIGHT: 63 IN | HEART RATE: 102 BPM

## 2019-03-14 DIAGNOSIS — Z79.01 LONG TERM CURRENT USE OF ANTICOAGULANT THERAPY: ICD-10-CM

## 2019-03-14 DIAGNOSIS — Z3A.19 19 WEEKS GESTATION OF PREGNANCY: ICD-10-CM

## 2019-03-14 DIAGNOSIS — O99.112 BLOOD COAGULATION DISORDER COMPLICATING PREGNANCY, SECOND TRIMESTER (HCC): ICD-10-CM

## 2019-03-14 DIAGNOSIS — Z03.75 SUSPECTED SHORTENING OF CERVIX NOT FOUND: ICD-10-CM

## 2019-03-14 DIAGNOSIS — F31.9 BIPOLAR DISEASE DURING PREGNANCY, SECOND TRIMESTER (HCC): ICD-10-CM

## 2019-03-14 DIAGNOSIS — O09.892 PRIOR PREGNANCY COMPLICATED BY PIH, ANTEPARTUM, SECOND TRIMESTER: ICD-10-CM

## 2019-03-14 DIAGNOSIS — O99.342 BIPOLAR DISEASE DURING PREGNANCY, SECOND TRIMESTER (HCC): ICD-10-CM

## 2019-03-14 DIAGNOSIS — O35.5XX0 SUSPECTED DAMAGE TO FETUS FROM DRUGS, AFFECTING MANAGEMENT OF MOTHER, SINGLE OR UNSPECIFIED FETUS: Primary | ICD-10-CM

## 2019-03-14 DIAGNOSIS — D68.9 BLOOD COAGULATION DISORDER COMPLICATING PREGNANCY, SECOND TRIMESTER (HCC): ICD-10-CM

## 2019-03-14 DIAGNOSIS — O99.332 TOBACCO SMOKING AFFECTING PREGNANCY IN SECOND TRIMESTER: ICD-10-CM

## 2019-03-14 DIAGNOSIS — Z36.89 ENCOUNTER FOR FETAL ANATOMIC SURVEY: ICD-10-CM

## 2019-03-14 LAB
GLUCOSE URINE, POC: NORMAL
PROTEIN UA: NEGATIVE

## 2019-03-14 PROCEDURE — 1036F TOBACCO NON-USER: CPT | Performed by: OBSTETRICS & GYNECOLOGY

## 2019-03-14 PROCEDURE — G8482 FLU IMMUNIZE ORDER/ADMIN: HCPCS | Performed by: OBSTETRICS & GYNECOLOGY

## 2019-03-14 PROCEDURE — 76817 TRANSVAGINAL US OBSTETRIC: CPT | Performed by: OBSTETRICS & GYNECOLOGY

## 2019-03-14 PROCEDURE — G8420 CALC BMI NORM PARAMETERS: HCPCS | Performed by: OBSTETRICS & GYNECOLOGY

## 2019-03-14 PROCEDURE — 81002 URINALYSIS NONAUTO W/O SCOPE: CPT | Performed by: OBSTETRICS & GYNECOLOGY

## 2019-03-14 PROCEDURE — 76811 OB US DETAILED SNGL FETUS: CPT | Performed by: OBSTETRICS & GYNECOLOGY

## 2019-03-14 PROCEDURE — 99211 OFF/OP EST MAY X REQ PHY/QHP: CPT | Performed by: OBSTETRICS & GYNECOLOGY

## 2019-03-14 PROCEDURE — G8427 DOCREV CUR MEDS BY ELIG CLIN: HCPCS | Performed by: OBSTETRICS & GYNECOLOGY

## 2019-03-14 PROCEDURE — 99213 OFFICE O/P EST LOW 20 MIN: CPT | Performed by: OBSTETRICS & GYNECOLOGY

## 2019-03-28 ENCOUNTER — HOSPITAL ENCOUNTER (EMERGENCY)
Age: 24
Discharge: HOME | End: 2019-03-29
Attending: EMERGENCY MEDICINE
Payer: COMMERCIAL

## 2019-03-28 DIAGNOSIS — E87.6 HYPOKALEMIA: ICD-10-CM

## 2019-03-28 DIAGNOSIS — N39.0 URINARY TRACT INFECTION WITHOUT HEMATURIA, SITE UNSPECIFIED: Primary | ICD-10-CM

## 2019-03-28 LAB
ALBUMIN SERPL-MCNC: 3.6 G/DL (ref 3.5–5.2)
ALP BLD-CCNC: 78 U/L (ref 35–104)
ALT SERPL-CCNC: 15 U/L (ref 0–32)
ANION GAP SERPL CALCULATED.3IONS-SCNC: 15 MMOL/L (ref 7–16)
AST SERPL-CCNC: 15 U/L (ref 0–31)
BACTERIA: ABNORMAL /HPF
BASOPHILS ABSOLUTE: 0.01 E9/L (ref 0–0.2)
BASOPHILS RELATIVE PERCENT: 0.1 % (ref 0–2)
BILIRUB SERPL-MCNC: 0.4 MG/DL (ref 0–1.2)
BILIRUBIN URINE: NEGATIVE
BLOOD, URINE: ABNORMAL
BUN BLDV-MCNC: 2 MG/DL (ref 6–20)
CALCIUM SERPL-MCNC: 8.9 MG/DL (ref 8.6–10.2)
CHLORIDE BLD-SCNC: 92 MMOL/L (ref 98–107)
CLARITY: CLEAR
CO2: 23 MMOL/L (ref 22–29)
COLOR: YELLOW
CREAT SERPL-MCNC: 0.5 MG/DL (ref 0.5–1)
EOSINOPHILS ABSOLUTE: 0.01 E9/L (ref 0.05–0.5)
EOSINOPHILS RELATIVE PERCENT: 0.1 % (ref 0–6)
GFR AFRICAN AMERICAN: >60
GFR NON-AFRICAN AMERICAN: >60 ML/MIN/1.73
GLUCOSE BLD-MCNC: 131 MG/DL (ref 74–99)
GLUCOSE URINE: 250 MG/DL
HCT VFR BLD CALC: 29.7 % (ref 34–48)
HEMOGLOBIN: 10.6 G/DL (ref 11.5–15.5)
IMMATURE GRANULOCYTES #: 0.05 E9/L
IMMATURE GRANULOCYTES %: 0.6 % (ref 0–5)
INFLUENZA A BY PCR: NOT DETECTED
INFLUENZA B BY PCR: NOT DETECTED
KETONES, URINE: 40 MG/DL
LACTIC ACID: 1.6 MMOL/L (ref 0.5–2.2)
LEUKOCYTE ESTERASE, URINE: ABNORMAL
LYMPHOCYTES ABSOLUTE: 1.89 E9/L (ref 1.5–4)
LYMPHOCYTES RELATIVE PERCENT: 21 % (ref 20–42)
MAGNESIUM: 1.7 MG/DL (ref 1.6–2.6)
MCH RBC QN AUTO: 29.2 PG (ref 26–35)
MCHC RBC AUTO-ENTMCNC: 35.7 % (ref 32–34.5)
MCV RBC AUTO: 81.8 FL (ref 80–99.9)
MONOCYTES ABSOLUTE: 0.49 E9/L (ref 0.1–0.95)
MONOCYTES RELATIVE PERCENT: 5.5 % (ref 2–12)
NEUTROPHILS ABSOLUTE: 6.53 E9/L (ref 1.8–7.3)
NEUTROPHILS RELATIVE PERCENT: 72.7 % (ref 43–80)
NITRITE, URINE: POSITIVE
PDW BLD-RTO: 15.3 FL (ref 11.5–15)
PH UA: 6 (ref 5–9)
PLATELET # BLD: 248 E9/L (ref 130–450)
PMV BLD AUTO: 8.7 FL (ref 7–12)
POTASSIUM REFLEX MAGNESIUM: 2.7 MMOL/L (ref 3.5–5)
PROTEIN UA: ABNORMAL MG/DL
RBC # BLD: 3.63 E12/L (ref 3.5–5.5)
RBC UA: ABNORMAL /HPF (ref 0–2)
SODIUM BLD-SCNC: 130 MMOL/L (ref 132–146)
SPECIFIC GRAVITY UA: <=1.005 (ref 1–1.03)
TOTAL PROTEIN: 7.2 G/DL (ref 6.4–8.3)
TROPONIN: <0.01 NG/ML (ref 0–0.03)
UROBILINOGEN, URINE: 1 E.U./DL
WBC # BLD: 9 E9/L (ref 4.5–11.5)
WBC UA: >20 /HPF (ref 0–5)

## 2019-03-28 PROCEDURE — 80053 COMPREHEN METABOLIC PANEL: CPT

## 2019-03-28 PROCEDURE — 96375 TX/PRO/DX INJ NEW DRUG ADDON: CPT

## 2019-03-28 PROCEDURE — 81001 URINALYSIS AUTO W/SCOPE: CPT

## 2019-03-28 PROCEDURE — 83605 ASSAY OF LACTIC ACID: CPT

## 2019-03-28 PROCEDURE — 85025 COMPLETE CBC W/AUTO DIFF WBC: CPT

## 2019-03-28 PROCEDURE — 96365 THER/PROPH/DIAG IV INF INIT: CPT

## 2019-03-28 PROCEDURE — 83735 ASSAY OF MAGNESIUM: CPT

## 2019-03-28 PROCEDURE — 2580000003 HC RX 258: Performed by: EMERGENCY MEDICINE

## 2019-03-28 PROCEDURE — 96367 TX/PROPH/DG ADDL SEQ IV INF: CPT

## 2019-03-28 PROCEDURE — 6360000002 HC RX W HCPCS: Performed by: EMERGENCY MEDICINE

## 2019-03-28 PROCEDURE — 6370000000 HC RX 637 (ALT 250 FOR IP): Performed by: EMERGENCY MEDICINE

## 2019-03-28 PROCEDURE — 87502 INFLUENZA DNA AMP PROBE: CPT

## 2019-03-28 PROCEDURE — 99284 EMERGENCY DEPT VISIT MOD MDM: CPT

## 2019-03-28 PROCEDURE — 84484 ASSAY OF TROPONIN QUANT: CPT

## 2019-03-28 RX ORDER — CEFDINIR 300 MG/1
300 CAPSULE ORAL 2 TIMES DAILY
Qty: 20 CAPSULE | Refills: 0 | Status: SHIPPED | OUTPATIENT
Start: 2019-03-28 | End: 2019-04-07

## 2019-03-28 RX ORDER — 0.9 % SODIUM CHLORIDE 0.9 %
30 INTRAVENOUS SOLUTION INTRAVENOUS ONCE
Status: DISCONTINUED | OUTPATIENT
Start: 2019-03-28 | End: 2019-03-28

## 2019-03-28 RX ORDER — DIPHENHYDRAMINE HYDROCHLORIDE 50 MG/ML
50 INJECTION INTRAMUSCULAR; INTRAVENOUS ONCE
Status: COMPLETED | OUTPATIENT
Start: 2019-03-28 | End: 2019-03-28

## 2019-03-28 RX ORDER — POTASSIUM CHLORIDE 7.45 MG/ML
10 INJECTION INTRAVENOUS
Status: COMPLETED | OUTPATIENT
Start: 2019-03-28 | End: 2019-03-29

## 2019-03-28 RX ORDER — POTASSIUM CHLORIDE 20 MEQ/1
60 TABLET, EXTENDED RELEASE ORAL ONCE
Status: COMPLETED | OUTPATIENT
Start: 2019-03-28 | End: 2019-03-28

## 2019-03-28 RX ORDER — METOCLOPRAMIDE HYDROCHLORIDE 5 MG/ML
10 INJECTION INTRAMUSCULAR; INTRAVENOUS ONCE
Status: COMPLETED | OUTPATIENT
Start: 2019-03-28 | End: 2019-03-28

## 2019-03-28 RX ORDER — ONDANSETRON 4 MG/1
4 TABLET, FILM COATED ORAL EVERY 8 HOURS PRN
Qty: 12 TABLET | Refills: 0 | Status: SHIPPED | OUTPATIENT
Start: 2019-03-28 | End: 2019-04-02

## 2019-03-28 RX ORDER — 0.9 % SODIUM CHLORIDE 0.9 %
30 INTRAVENOUS SOLUTION INTRAVENOUS ONCE
Status: COMPLETED | OUTPATIENT
Start: 2019-03-28 | End: 2019-03-29

## 2019-03-28 RX ADMIN — POTASSIUM CHLORIDE 10 MEQ: 10 INJECTION, SOLUTION INTRAVENOUS at 22:57

## 2019-03-28 RX ADMIN — DIPHENHYDRAMINE HYDROCHLORIDE 50 MG: 50 INJECTION, SOLUTION INTRAMUSCULAR; INTRAVENOUS at 22:14

## 2019-03-28 RX ADMIN — SODIUM CHLORIDE 1947 ML: 9 INJECTION, SOLUTION INTRAVENOUS at 22:12

## 2019-03-28 RX ADMIN — POTASSIUM CHLORIDE 60 MEQ: 20 TABLET, EXTENDED RELEASE ORAL at 22:57

## 2019-03-28 RX ADMIN — CEFTRIAXONE 1 G: 1 INJECTION, POWDER, FOR SOLUTION INTRAMUSCULAR; INTRAVENOUS at 22:47

## 2019-03-28 RX ADMIN — METOCLOPRAMIDE 10 MG: 5 INJECTION, SOLUTION INTRAMUSCULAR; INTRAVENOUS at 22:14

## 2019-03-28 ASSESSMENT — PAIN SCALES - GENERAL: PAINLEVEL_OUTOF10: 7

## 2019-03-28 ASSESSMENT — PAIN DESCRIPTION - LOCATION: LOCATION: HEAD

## 2019-03-28 ASSESSMENT — PAIN DESCRIPTION - PAIN TYPE: TYPE: ACUTE PAIN

## 2019-03-29 VITALS
SYSTOLIC BLOOD PRESSURE: 103 MMHG | TEMPERATURE: 99.3 F | WEIGHT: 143 LBS | HEIGHT: 63 IN | RESPIRATION RATE: 16 BRPM | HEART RATE: 107 BPM | OXYGEN SATURATION: 96 % | DIASTOLIC BLOOD PRESSURE: 54 MMHG | BODY MASS INDEX: 25.34 KG/M2

## 2019-03-29 LAB — POTASSIUM SERPL-SCNC: 3.8 MMOL/L (ref 3.5–5)

## 2019-03-29 PROCEDURE — 6360000002 HC RX W HCPCS: Performed by: EMERGENCY MEDICINE

## 2019-03-29 PROCEDURE — 96366 THER/PROPH/DIAG IV INF ADDON: CPT

## 2019-03-29 PROCEDURE — 84132 ASSAY OF SERUM POTASSIUM: CPT

## 2019-03-29 RX ORDER — POTASSIUM CHLORIDE 20 MEQ/1
40 TABLET, EXTENDED RELEASE ORAL DAILY
Qty: 10 TABLET | Refills: 0 | Status: SHIPPED | OUTPATIENT
Start: 2019-03-29 | End: 2019-06-20 | Stop reason: ALTCHOICE

## 2019-03-29 RX ADMIN — POTASSIUM CHLORIDE 10 MEQ: 10 INJECTION, SOLUTION INTRAVENOUS at 00:03

## 2019-03-29 NOTE — ED PROVIDER NOTES
15,  Psych: Normal Affect      ------------------------------ ED COURSE/MEDICAL DECISION MAKING----------------------  Medications   0.9 % sodium chloride bolus (0 mLs Intravenous Stopped 3/29/19 0101)   metoclopramide (REGLAN) injection 10 mg (10 mg Intravenous Given 3/28/19 2214)   diphenhydrAMINE (BENADRYL) injection 50 mg (50 mg Intravenous Given 3/28/19 2214)   cefTRIAXone (ROCEPHIN) 1 g in dextrose 5 % 50 mL IVPB (vial-mate) (0 g Intravenous Stopped 3/28/19 2317)   potassium chloride 10 mEq/100 mL IVPB (Peripheral Line) (10 mEq Intravenous New Bag 3/29/19 0003)   potassium chloride (KLOR-CON M) extended release tablet 60 mEq (60 mEq Oral Given 3/28/19 2257)         Medical Decision Making:    Suspected viral syndrome-will rehydrate and check influenza and urinalysis    Counseling: The emergency provider has spoken with the patient and discussed todays results, in addition to providing specific details for the plan of care and counseling regarding the diagnosis and prognosis. Questions are answered at this time and they are agreeable with the plan.      --------------------------------- IMPRESSION AND DISPOSITION ---------------------------------    IMPRESSION  1. Urinary tract infection without hematuria, site unspecified Stable   2.  Hypokalemia Improving       DISPOSITION  Disposition: Discharge to home  Patient condition is stable      ER course: Patient feeling much better after IV fluids and antibiotics and potassium supplementation    Critical care time of 35 minutes            Ayde Lester MD  03/29/19 1995

## 2019-04-04 LAB
EKG ATRIAL RATE: 109 BPM
EKG P AXIS: 50 DEGREES
EKG P-R INTERVAL: 122 MS
EKG Q-T INTERVAL: 332 MS
EKG QRS DURATION: 98 MS
EKG QTC CALCULATION (BAZETT): 447 MS
EKG R AXIS: 72 DEGREES
EKG T AXIS: 68 DEGREES
EKG VENTRICULAR RATE: 109 BPM

## 2019-05-23 ENCOUNTER — ROUTINE PRENATAL (OUTPATIENT)
Dept: OBGYN CLINIC | Age: 24
End: 2019-05-23
Payer: COMMERCIAL

## 2019-05-23 VITALS
WEIGHT: 143 LBS | BODY MASS INDEX: 25.34 KG/M2 | HEIGHT: 63 IN | DIASTOLIC BLOOD PRESSURE: 82 MMHG | SYSTOLIC BLOOD PRESSURE: 114 MMHG | HEART RATE: 116 BPM

## 2019-05-23 DIAGNOSIS — O99.343 BIPOLAR DISEASE DURING PREGNANCY, THIRD TRIMESTER (HCC): ICD-10-CM

## 2019-05-23 DIAGNOSIS — Z79.01 LONG TERM CURRENT USE OF ANTICOAGULANT THERAPY: ICD-10-CM

## 2019-05-23 DIAGNOSIS — F31.9 BIPOLAR DISEASE DURING PREGNANCY, THIRD TRIMESTER (HCC): ICD-10-CM

## 2019-05-23 DIAGNOSIS — D68.9 BLOOD COAGULATION DISORDER COMPLICATING PREGNANCY, THIRD TRIMESTER (HCC): Primary | ICD-10-CM

## 2019-05-23 DIAGNOSIS — O99.333 TOBACCO SMOKING COMPLICATING PREGNANCY, THIRD TRIMESTER: ICD-10-CM

## 2019-05-23 DIAGNOSIS — O09.893 PRIOR PREGNANCY COMPLICATED BY PIH, ANTEPARTUM, THIRD TRIMESTER: ICD-10-CM

## 2019-05-23 DIAGNOSIS — Z36.89 ENCOUNTER FOR FETAL ANATOMIC SURVEY: ICD-10-CM

## 2019-05-23 DIAGNOSIS — Z3A.29 29 WEEKS GESTATION OF PREGNANCY: ICD-10-CM

## 2019-05-23 DIAGNOSIS — O99.113 BLOOD COAGULATION DISORDER COMPLICATING PREGNANCY, THIRD TRIMESTER (HCC): Primary | ICD-10-CM

## 2019-05-23 LAB
GLUCOSE URINE, POC: POSITIVE
PROTEIN UA: NEGATIVE

## 2019-05-23 PROCEDURE — 99213 OFFICE O/P EST LOW 20 MIN: CPT | Performed by: OBSTETRICS & GYNECOLOGY

## 2019-05-23 PROCEDURE — 76805 OB US >/= 14 WKS SNGL FETUS: CPT | Performed by: OBSTETRICS & GYNECOLOGY

## 2019-05-23 PROCEDURE — 81002 URINALYSIS NONAUTO W/O SCOPE: CPT | Performed by: OBSTETRICS & GYNECOLOGY

## 2019-05-23 PROCEDURE — 99211 OFF/OP EST MAY X REQ PHY/QHP: CPT | Performed by: OBSTETRICS & GYNECOLOGY

## 2019-05-23 PROCEDURE — G8419 CALC BMI OUT NRM PARAM NOF/U: HCPCS | Performed by: OBSTETRICS & GYNECOLOGY

## 2019-05-23 PROCEDURE — 1036F TOBACCO NON-USER: CPT | Performed by: OBSTETRICS & GYNECOLOGY

## 2019-05-23 PROCEDURE — 76819 FETAL BIOPHYS PROFIL W/O NST: CPT | Performed by: OBSTETRICS & GYNECOLOGY

## 2019-05-23 PROCEDURE — G8427 DOCREV CUR MEDS BY ELIG CLIN: HCPCS | Performed by: OBSTETRICS & GYNECOLOGY

## 2019-05-23 NOTE — LETTER
19     RE:  Allyssa Ledezma   : 1995   AGE: 24 y.o. REFERRING PHYSICIAN:    Jamil Londono    Dear   Mrs. Allyssa Ledezma a 24 y.o.  [id number]  is seen today on follow up in our office. REASON FOR APPOINTMENT:  · Follow up on a pregnant patient with complex thrombophilia, history of cigarette smoking and PIH in prior pregnancy. MEDICATIONS:    Prenatal Vitamins  Prior to Admission medications    Medication Sig Start Date End Date Taking? Authorizing Provider   potassium chloride (KLOR-CON M) 20 MEQ extended release tablet Take 2 tablets by mouth daily 3/29/19  Yes Bailey Abbott MD   Calcium Carbonate Antacid (TUMS PO) Take by mouth   Yes Historical Provider, MD   aspirin 81 MG chewable tablet Take 81 mg by mouth daily   Yes Historical Provider, MD   busPIRone (BUSPAR) 7.5 MG tablet Take 1 tablet by mouth 2 times daily 1/3/19  Yes Katlin Moctezuma,    enoxaparin (LOVENOX) 40 MG/0.4ML injection Inject 0.4 mLs into the skin daily 18  Yes Mariluz Brewer DO   Prenatal Vit-Fe Fumarate-FA (PRENATAL PLUS PO) Take 1 tablet by mouth daily   Yes Historical Provider, MD       INTERVAL HISTORY:  Mrs Allyssa Ledezma had an uneventful course of pregnancy since her last visit to our office. When seen today in our office she had no complaints. PHYSICAL EXAMINATION:  General Appearance:  Healthy looking, alert, no acute distress. Eyes:     No pallor, no icterus, no photophobia. Ears:     No ear drainage. Nose:     No nasal drainage, no paranasal sinus tenderness. Throat:   Mucosa moist, no oral thrush, no exudate. Neck:     No nuchal rigidity. Back:     No CVA tenderness. Abdomen:    Soft nontender. Extremities:    No pretibial pitting edema, no calf muscle tenderness. Skin:     No rashes, no lesions. BP: 114/82 Weight: 143 lb (64.9 kg) Height: 5' 3\" (160 cm) Pulse: 116     Body mass index is 25.33 kg/m².   Urine dipstick:  Glucose : Trace   Albumin:  Negative

## 2019-05-23 NOTE — PROGRESS NOTES
19     RE:  Ophelia Lyon   : 1995   AGE: 24 y.o. REFERRING PHYSICIAN:    Coni [place]    Dear   Mrs. Ophelia Lyon a 24 y.o.  [id number]  is seen today on follow up in our office. REASON FOR APPOINTMENT:  · Follow up on a pregnant patient with complex thrombophilia, history of cigarette smoking and PIH in prior pregnancy. MEDICATIONS:    Prenatal Vitamins  Prior to Admission medications    Medication Sig Start Date End Date Taking? Authorizing Provider   potassium chloride (KLOR-CON M) 20 MEQ extended release tablet Take 2 tablets by mouth daily 3/29/19  Yes Shelby Ann MD   Calcium Carbonate Antacid (TUMS PO) Take by mouth   Yes Historical Provider, MD   aspirin 81 MG chewable tablet Take 81 mg by mouth daily   Yes Historical Provider, MD   busPIRone (BUSPAR) 7.5 MG tablet Take 1 tablet by mouth 2 times daily 1/3/19  Yes Sherita Emery,    enoxaparin (LOVENOX) 40 MG/0.4ML injection Inject 0.4 mLs into the skin daily 18  Yes Ai Allan DO   Prenatal Vit-Fe Fumarate-FA (PRENATAL PLUS PO) Take 1 tablet by mouth daily   Yes Historical Provider, MD       INTERVAL HISTORY:  Mrs Ophelia Lyon had an uneventful course of pregnancy since her last visit to our office. When seen today in our office she had no complaints. PHYSICAL EXAMINATION:  General Appearance:  Healthy looking, alert, no acute distress. Eyes:     No pallor, no icterus, no photophobia. Ears:     No ear drainage. Nose:     No nasal drainage, no paranasal sinus tenderness. Throat:   Mucosa moist, no oral thrush, no exudate. Neck:     No nuchal rigidity. Back:     No CVA tenderness. Abdomen:    Soft nontender. Extremities:    No pretibial pitting edema, no calf muscle tenderness. Skin:     No rashes, no lesions. BP: 114/82 Weight: 143 lb (64.9 kg) Height: 5' 3\" (160 cm) Pulse: 116     Body mass index is 25.33 kg/m².   Urine dipstick:  Glucose : Trace   Albumin:  Negative smoking. 6. She is to continue the treatment with baby aspirin once per day to decrease likelihood of developing superimposed preeclampsia with this pregnancy. 7. She is to continue the treatment with Lovenox. I recommend calcium supplementation (I told her to take three tablets of calcium carbonate (TUMS), because the association between long term use of heparin and osteopenia. 8. Fetal well-being was confirmed today. The amount of fluid around baby is normal.  The Biophysical profile score of 8/8 is reassuring, and the umbilical artery Doppler studies are normal.  9. She should monitor fetal well-being at home by counting movements after dinner. Her baby should  move 10 times in 2 hours; otherwise, she should call your office immediately. She is also to call, if she develops any headaches, blurred vision, abdominal pain, bleeding, or spotting, which are signs of preeclampsia. 10. She is to continue to follow with you in your office for ongoing obstetric care. 11. I recommend follow-up ultrasound evaluation in our office in 4 weeks to check on fetal well being anatomy and growth. Thank you again, doctor, for allowing us to be of service to your patient. If I can be of further assistance, please do not hesitate to call. Sincerely,        Joselo Aceves M.D., FACOG    [facility] OFFICE OUTPATIENT VISIT 74 MINUTES [14851]  US OB 14 Plus Weeks Single or First Gestation [27967 Custom]  US Fetal Biophysical Profile WO Non Stress Testing [37682 Custom]    **This report has been created using voice recognition software.  It may contain minor errors     which are inherent in voice recognition technology**

## 2019-05-23 NOTE — PROGRESS NOTES
C/o occasinal ctx. Good fetal movement. Kick counts encouraged.  Denies bleeding,lofAll questions answered+ information confirmed by pt

## 2019-05-23 NOTE — PATIENT INSTRUCTIONS
all appointments, and call your doctor if you are having problems. It's also a good idea to know your test results and keep a list of the medicines you take. How can you care for yourself at home? · Take your medicines exactly as prescribed. Call your doctor if you think you are having a problem with your medicine. You will get more details on the specific medicines your doctor prescribes. · Stay at a healthy body weight. If you are overweight, the additional stress on joints can trigger bleeding. · Exercise safely. Avoid contact sports. Swim or walk to avoid excess pressure on your joints. Check with your doctor before doing activities that put you at high risk for falls, such as riding a bike. · Brush and floss your teeth daily. This may help you avoid problems that could lead to having a tooth pulled. · Avoid aspirin and nonsteroidal anti-inflammatory drugs (NSAIDs), such as ibuprofen (Advil, Motrin) or naproxen (Aleve). They can increase the chance of bleeding. · Take pain medicines exactly as directed. ? If the doctor gave you a prescription medicine for pain, take it as prescribed. ? If you are not taking a prescription pain medicine, ask your doctor if you can take an over-the-counter medicine. · Take care to prevent accidents at home:  ? Make sure rugs are tacked down so you do not slip. ? Keep furniture with sharp edges out of pathways. ? Use nonskid floor wax. ? Wipe up spills quickly. ? If you live in an area that gets snow and ice in the winter, sprinkle salt on steps and sidewalks. ? Avoid loose-fitting shoes. You might lose your balance and fall. · Wear medical alert jewelry that lists your clotting problem. You can buy this at most drugstores. When should you call for help? Call 911 anytime you think you may need emergency care. For example, call if:    · You passed out (lost consciousness).     · You have signs of severe bleeding, which includes:  ?  You have a severe headache that needs. If you feel sexual, explore ways to be close with your partner that match your comfort and desire. Use the tips provided in this care sheet to find ways to be sexual in your own way. Follow-up care is a key part of your treatment and safety. Be sure to make and go to all appointments, and call your doctor if you are having problems. It's also a good idea to know your test results and keep a list of the medicines you take. How can you care for yourself at home? Take it easy at work  · Take frequent breaks. If possible, stop working when you are tired, and rest during your lunch hour. · Take bathroom breaks every 2 hours. · Change positions often. If you sit for long periods, stand up and walk around. · When you stand for a long time, keep one foot on a low stool with your knee bent. After standing a lot, sit with your feet up. · Avoid fumes, chemicals, and tobacco smoke. Be sexual in your own way  · Having sex during pregnancy is okay, unless your doctor tells you not to. · You may be very interested in sex, or you may have no interest at all. · Your growing belly can make it hard to find a good position during intercourse. [place] and explore. · You may get cramps in your uterus when your partner touches your breasts. · A back rub may relieve the backache or cramps that sometimes follow orgasm. Learn about  labor  · Watch for signs of  labor. You may be going into labor if:  ? You have menstrual-like cramps, with or without nausea. ? You have about 6 or more contractions in 1 hour, even after you have had a glass of water and are resting. ? You have a low, dull backache that does not go away when you change your position. ? You have pain or pressure in your pelvis that comes and goes in a pattern. ? You have intestinal cramping or flu-like symptoms, with or without diarrhea.  ? You notice an increase or change in your vaginal discharge.  Discharge may be heavy, mucus-like, watery, or streaked with blood. ? Your water breaks. · If you think you have  labor:  ? Drink 2 or 3 glasses of water or juice. Not drinking enough fluids can cause contractions. ? Stop what you are doing, and empty your bladder. Then lie down on your left side for at least 1 hour. ? While lying on your side, find your breast bone. Put your fingers in the soft spot just below it. Move your fingers down toward your belly button to find the top of your uterus. Check to see if it is tight. ? Contractions can be weak or strong. Record your contractions for an hour. Time a contraction from the start of one contraction to the start of the next one.  ? Single or several strong contractions without a pattern are called [place] contractions. They are practice contractions but not the start of labor. They often stop if you change what you are doing. ? Call your doctor if you have regular contractions. Where can you learn more? Go to https://Guitar Party.Spotware Systems / cTrader. org and sign in to your meebee account. Enter U318 in the Peeridea box to learn more about \"Weeks 26 to 30 of Your Pregnancy: Care Instructions. \"     If you do not have an account, please click on the \"Sign Up Now\" link. Current as of: 2018  Content Version: 12.0  © 5748-8378 Healthwise, Incorporated. Care instructions adapted under license by [facility] ([facility]). If you have questions about a medical condition or this instruction, always ask your healthcare professional. [place] [address] any warranty or liability for your use of this information. Patient Education        Learning About When to Call Your Doctor During Pregnancy (After 20 Weeks)  Your Care Instructions  It's common to have concerns about what might be a problem during pregnancy.  Although most pregnant women don't have any serious problems, it's important to know when to call your doctor if you have certain symptoms or signs of labor. These are general suggestions. Your doctor may give you some more information about when to call. When to call your doctor (after 20 weeks)  Call 911 anytime you think you may need emergency care. For example, call if:  · You have severe vaginal bleeding. · You have sudden, severe pain in your belly. · You passed out (lost consciousness). · You have a seizure. · You see or feel the umbilical cord. · You think you are about to deliver your baby and can't make it safely to the hospital.  Call your doctor now or seek immediate medical care if:  · You have vaginal bleeding. · You have belly pain. · You have a fever. · You have symptoms of preeclampsia, such as:  ? Sudden swelling of your face, hands, or feet. ? New vision problems (such as dimness or blurring). ? A severe headache. · You have a sudden release of fluid from your vagina. (You think your water broke.)  · You think that you may be in labor. This means that you've had at least 4 contractions within 20 minutes or at least 8 contractions in an hour. · You notice that your baby has stopped moving or is moving much less than normal.  · You have symptoms of a urinary tract infection. These may include:  ? Pain or burning when you urinate. ? A frequent need to urinate without being able to pass much urine. ? Pain in the flank, which is just below the rib cage and above the waist on either side of the back. ? Blood in your urine. Watch closely for changes in your health, and be sure to contact your doctor if:  · You have vaginal discharge that smells bad. · You have skin changes, such as:  ? A rash. ? Itching. ? Yellow color to your skin. · You have other concerns about your pregnancy. If you have labor signs at 37 weeks or more  If you have signs of labor at 37 weeks or more, your doctor may tell you to call when your labor becomes more active. Symptoms of active labor include:  · Contractions that are regular.   · Contractions that are less than 5 minutes apart. · Contractions that are hard to talk through. Follow-up care is a key part of your treatment and safety. Be sure to make and go to all appointments, and call your doctor if you are having problems. It's also a good idea to know your test results and keep a list of the medicines you take. Where can you learn more? Go to https://chpepiceweb.[facility]. org and sign in to your [employer] account. Enter  in the Farm At Hand box to learn more about \"Learning About When to Call Your Doctor During Pregnancy (After 20 Weeks). \"     If you do not have an account, please click on the \"Sign Up Now\" link. Current as of: September 5, 2018  Content Version: 12.0  © 1481-6042 Healthwise, iPinYou. Care instructions adapted under license by [facility] ([facility]). If you have questions about a medical condition or this instruction, always ask your healthcare professional. [place] [address] any warranty or liability for your use of this information. Patient Education        Counting Your Baby's Kicks: Care Instructions  Your Care Instructions    Counting your baby's kicks is one way your doctor can tell that your baby is healthy. Most women--especially in a first pregnancy--feel their baby move for the first time between 16 and 22 weeks. The movement may feel like flutters rather than kicks. Your baby may move more at certain times of the day. When you are active, you may notice less kicking than when you are resting. At your prenatal visits, your doctor will ask whether the baby is active. In your last trimester, your doctor may ask you to count the number of times you feel your baby move. Follow-up care is a key part of your treatment and safety. Be sure to make and go to all appointments, and call your doctor if you are having problems. It's also a good idea to know your test results and keep a list of the medicines you take.   How do you count fetal kicks?  · A common method of checking your baby's movement is to count the number of kicks or moves you feel in 1 hour. Ten movements (such as kicks, flutters, or rolls) in 1 hour are normal. Some doctors suggest that you count in the morning until you get to 10 movements. Then you can quit for that day and start again the next day. · Pick your baby's most active time of day to count. This may be any time from morning to evening. · If you do not feel 10 movements in an hour, your baby may be sleeping. Wait for the next hour and count again. When should you call for help? Call your doctor now or seek immediate medical care if:    · You noticed that your baby has stopped moving or is moving much less than normal.    Watch closely for changes in your health, and be sure to contact your doctor if you have any problems. Where can you learn more? Go to https://Fyreplug Inc..ffk environment. org and sign in to your SpaBoom account. Enter S120 in the BlackJet box to learn more about \"Counting Your Baby's Kicks: Care Instructions. \"     If you do not have an account, please click on the \"Sign Up Now\" link. Current as of: September 5, 2018  Content Version: 12.0  © 4364-1525 Healthwise, Incorporated. Care instructions adapted under license by [facility] ([facility]). If you have questions about a medical condition or this instruction, always ask your healthcare professional. [place] [address] any warranty or liability for your use of this information.

## 2019-06-06 ENCOUNTER — HOSPITAL ENCOUNTER (OUTPATIENT)
Age: 24
Discharge: HOME | End: 2019-06-06
Attending: OBSTETRICS & GYNECOLOGY | Admitting: OBSTETRICS & GYNECOLOGY
Payer: COMMERCIAL

## 2019-06-06 VITALS
TEMPERATURE: 98.2 F | DIASTOLIC BLOOD PRESSURE: 60 MMHG | SYSTOLIC BLOOD PRESSURE: 108 MMHG | RESPIRATION RATE: 16 BRPM | HEART RATE: 85 BPM

## 2019-06-06 PROBLEM — R12 HEARTBURN DURING PREGNANCY IN THIRD TRIMESTER: Status: ACTIVE | Noted: 2019-06-06

## 2019-06-06 PROBLEM — O26.893 HEARTBURN DURING PREGNANCY IN THIRD TRIMESTER: Status: ACTIVE | Noted: 2019-06-06

## 2019-06-06 LAB
ALBUMIN SERPL-MCNC: 3.5 G/DL (ref 3.5–5.2)
ALP BLD-CCNC: 54 U/L (ref 35–104)
ALT SERPL-CCNC: 12 U/L (ref 0–32)
ANION GAP SERPL CALCULATED.3IONS-SCNC: 12 MMOL/L (ref 7–16)
AST SERPL-CCNC: 45 U/L (ref 0–31)
BILIRUB SERPL-MCNC: <0.2 MG/DL (ref 0–1.2)
BILIRUBIN URINE: NEGATIVE
BLOOD, URINE: NEGATIVE
BUN BLDV-MCNC: 4 MG/DL (ref 6–20)
CALCIUM SERPL-MCNC: 8.7 MG/DL (ref 8.6–10.2)
CHLORIDE BLD-SCNC: 102 MMOL/L (ref 98–107)
CLARITY: CLEAR
CO2: 19 MMOL/L (ref 22–29)
COLOR: ABNORMAL
CREAT SERPL-MCNC: 0.4 MG/DL (ref 0.5–1)
GFR AFRICAN AMERICAN: >60
GFR NON-AFRICAN AMERICAN: >60 ML/MIN/1.73
GLUCOSE BLD-MCNC: 88 MG/DL (ref 74–99)
GLUCOSE URINE: 100 MG/DL
HCT VFR BLD CALC: 28.4 % (ref 34–48)
HEMOGLOBIN: 9.5 G/DL (ref 11.5–15.5)
KETONES, URINE: NEGATIVE MG/DL
LEUKOCYTE ESTERASE, URINE: NEGATIVE
MCH RBC QN AUTO: 27.9 PG (ref 26–35)
MCHC RBC AUTO-ENTMCNC: 33.5 % (ref 32–34.5)
MCV RBC AUTO: 83.5 FL (ref 80–99.9)
NITRITE, URINE: NEGATIVE
PDW BLD-RTO: 13.3 FL (ref 11.5–15)
PH UA: 6.5 (ref 5–9)
PLATELET # BLD: 226 E9/L (ref 130–450)
PMV BLD AUTO: 9.9 FL (ref 7–12)
POTASSIUM SERPL-SCNC: 4.7 MMOL/L (ref 3.5–5)
PROTEIN UA: NEGATIVE MG/DL
RBC # BLD: 3.4 E12/L (ref 3.5–5.5)
SODIUM BLD-SCNC: 133 MMOL/L (ref 132–146)
SPECIFIC GRAVITY UA: <=1.005 (ref 1–1.03)
TOTAL PROTEIN: 6.4 G/DL (ref 6.4–8.3)
UROBILINOGEN, URINE: 0.2 E.U./DL
WBC # BLD: 8.7 E9/L (ref 4.5–11.5)

## 2019-06-06 PROCEDURE — 59025 FETAL NON-STRESS TEST: CPT

## 2019-06-06 PROCEDURE — 36415 COLL VENOUS BLD VENIPUNCTURE: CPT

## 2019-06-06 PROCEDURE — 81003 URINALYSIS AUTO W/O SCOPE: CPT

## 2019-06-06 PROCEDURE — 99213 OFFICE O/P EST LOW 20 MIN: CPT | Performed by: NURSE PRACTITIONER

## 2019-06-06 PROCEDURE — 85027 COMPLETE CBC AUTOMATED: CPT

## 2019-06-06 PROCEDURE — 80053 COMPREHEN METABOLIC PANEL: CPT

## 2019-06-06 PROCEDURE — 6370000000 HC RX 637 (ALT 250 FOR IP): Performed by: NURSE PRACTITIONER

## 2019-06-06 PROCEDURE — 99211 OFF/OP EST MAY X REQ PHY/QHP: CPT

## 2019-06-06 RX ORDER — ACETAMINOPHEN 500 MG
1000 TABLET ORAL EVERY 6 HOURS PRN
Status: DISCONTINUED | OUTPATIENT
Start: 2019-06-06 | End: 2019-06-06 | Stop reason: HOSPADM

## 2019-06-06 RX ORDER — FAMOTIDINE 20 MG/1
20 TABLET, FILM COATED ORAL 2 TIMES DAILY
Status: DISCONTINUED | OUTPATIENT
Start: 2019-06-06 | End: 2019-06-06 | Stop reason: HOSPADM

## 2019-06-06 RX ADMIN — FAMOTIDINE 20 MG: 20 TABLET ORAL at 02:36

## 2019-06-06 RX ADMIN — ACETAMINOPHEN 1000 MG: 500 TABLET ORAL at 02:35

## 2019-06-06 ASSESSMENT — PAIN SCALES - GENERAL: PAINLEVEL_OUTOF10: 6

## 2019-06-06 NOTE — PROGRESS NOTES
Dr. Bridgett Cabrera updated on labs and that medication helped pt feel somewhat better.   OK to d/c home with instructions to call office for prescription for pepcid in the AM.

## 2019-06-06 NOTE — H&P
Department of Obstetrics and Gynecology  Labor and Delivery  Triage Note      SUBJECTIVE:  Ophelia Lyon is a 24 y.o. female, [id number], Patient's last menstrual period was 10/28/2018 (approximate). , Estimated Date of Delivery: 8/4/19, 31w4d, here with the complaint of abdominal cramping, headache, and heartburn. She reports headache unrelieved by tylenol and heartburn unrelieved by tums. Denies blurred vision, epigastric pain, or swelling. Hx of pih in prior pregnancy, thrombophilia treated with lovenox.     Prenatal course: thrombophilia, PIH in prior preg    Review of Systems:   Ears, nose, mouth, throat, and face: negative  Respiratory: negative  Cardiovascular: negative  Gastrointestinal: negative  Genitourinary:negative  Integument/breast: negative  Hematologic/lymphatic: negative  Musculoskeletal:negative  Neurological: negative  Behavioral/Psych: negative  Endocrine: negative  Allergic/Immunologic: negative    OBJECTIVE    Vitals:  /60   Pulse 85   Temp 98.2 °F (36.8 °C) (Oral)   Resp 16   LMP 10/28/2018 (Approximate)     General- alert, NAD  Skin- warm and dry, no rashes seen  Psych- normal mood and affect  Neuro- CN II-XII grossly intact  Abdomen: soft, nontender  musculoskeletal-generalized back tenderness/pain    Vaginal Exam:  Cervical dilatation: closed, Effacement: thick, Station: -2, Membranes: intact    Fetal heart rate:         Baseline Heart Rate:  125        Accelerations:  present       Decelerations:  absent       Variability:  moderate    Contraction frequency: none    ASSESSMENT:    Headache/heartburn  N/v  cramping    Plan: d/w Dr. Aleksander Chen  cmp  Urinalysis  Tylenol  pepcid

## 2019-06-20 ENCOUNTER — ROUTINE PRENATAL (OUTPATIENT)
Dept: OBGYN CLINIC | Age: 24
End: 2019-06-20
Payer: COMMERCIAL

## 2019-06-20 VITALS
SYSTOLIC BLOOD PRESSURE: 114 MMHG | HEART RATE: 105 BPM | WEIGHT: 146 LBS | DIASTOLIC BLOOD PRESSURE: 79 MMHG | HEIGHT: 63 IN | BODY MASS INDEX: 25.87 KG/M2

## 2019-06-20 DIAGNOSIS — F31.9 BIPOLAR DISEASE DURING PREGNANCY, THIRD TRIMESTER (HCC): ICD-10-CM

## 2019-06-20 DIAGNOSIS — O99.333 TOBACCO SMOKING COMPLICATING PREGNANCY, THIRD TRIMESTER: ICD-10-CM

## 2019-06-20 DIAGNOSIS — O99.113 BLOOD COAGULATION DISORDER COMPLICATING PREGNANCY, THIRD TRIMESTER (HCC): Primary | ICD-10-CM

## 2019-06-20 DIAGNOSIS — Z3A.33 33 WEEKS GESTATION OF PREGNANCY: ICD-10-CM

## 2019-06-20 DIAGNOSIS — O09.893 PRIOR PREGNANCY COMPLICATED BY PIH, ANTEPARTUM, THIRD TRIMESTER: ICD-10-CM

## 2019-06-20 DIAGNOSIS — Z79.01 LONG TERM CURRENT USE OF ANTICOAGULANT THERAPY: ICD-10-CM

## 2019-06-20 DIAGNOSIS — O99.343 BIPOLAR DISEASE DURING PREGNANCY, THIRD TRIMESTER (HCC): ICD-10-CM

## 2019-06-20 DIAGNOSIS — D68.9 BLOOD COAGULATION DISORDER COMPLICATING PREGNANCY, THIRD TRIMESTER (HCC): Primary | ICD-10-CM

## 2019-06-20 LAB
GLUCOSE URINE, POC: NEGATIVE
PROTEIN UA: NEGATIVE

## 2019-06-20 PROCEDURE — 76819 FETAL BIOPHYS PROFIL W/O NST: CPT | Performed by: OBSTETRICS & GYNECOLOGY

## 2019-06-20 PROCEDURE — 99213 OFFICE O/P EST LOW 20 MIN: CPT | Performed by: OBSTETRICS & GYNECOLOGY

## 2019-06-20 PROCEDURE — 99211 OFF/OP EST MAY X REQ PHY/QHP: CPT | Performed by: OBSTETRICS & GYNECOLOGY

## 2019-06-20 PROCEDURE — 81002 URINALYSIS NONAUTO W/O SCOPE: CPT | Performed by: OBSTETRICS & GYNECOLOGY

## 2019-06-20 PROCEDURE — G8419 CALC BMI OUT NRM PARAM NOF/U: HCPCS | Performed by: OBSTETRICS & GYNECOLOGY

## 2019-06-20 PROCEDURE — G8427 DOCREV CUR MEDS BY ELIG CLIN: HCPCS | Performed by: OBSTETRICS & GYNECOLOGY

## 2019-06-20 PROCEDURE — 1036F TOBACCO NON-USER: CPT | Performed by: OBSTETRICS & GYNECOLOGY

## 2019-06-20 PROCEDURE — 76816 OB US FOLLOW-UP PER FETUS: CPT | Performed by: OBSTETRICS & GYNECOLOGY

## 2019-06-20 NOTE — PROGRESS NOTES
19     RE:  Jessie Nazario   : 1995   AGE: 24 y.o. REFERRING PHYSICIAN:    Kiley Barton    Dear   Mrs. Jessie Nazario a 24 y.o.  [id number]  is seen today on follow up in our office. REASON FOR APPOINTMENT:  · Follow up on a pregnant patient with complex thrombophilia, history of cigarette smoking and PIH in prior pregnancy. Prior to Admission medications    Medication Sig Start Date End Date Taking? Authorizing Provider   famotidine (PEPCID) 20 MG tablet Take 1 tablet by mouth daily 19  Yes Magaly Emery DO   Calcium Carbonate Antacid (TUMS PO) Take by mouth   Yes Historical Provider, MD   aspirin 81 MG chewable tablet Take 81 mg by mouth daily   Yes Historical Provider, MD   busPIRone (BUSPAR) 7.5 MG tablet Take 1 tablet by mouth 2 times daily 1/3/19  Yes Darien Palacios,    enoxaparin (LOVENOX) 40 MG/0.4ML injection Inject 0.4 mLs into the skin daily 18  Yes Magaly Emery DO   Prenatal Vit-Fe Fumarate-FA (PRENATAL PLUS PO) Take 1 tablet by mouth daily   Yes Historical Provider, MD     INTERVAL HISTORY:  Mrs Jessie Nazario had an uneventful course of pregnancy since her last visit to our office. When seen today in our office she had no complaints. She is still consuming tobacco (vaping)  She said that she is being monitored with nonstress tests in your office every 3 to 4 days. PHYSICAL EXAMINATION:  General Appearance:  Healthy looking, alert, no acute distress. Eyes:     No pallor, no icterus, no photophobia. Ears:     No ear drainage. Nose:     No nasal drainage, no paranasal sinus tenderness. Throat:   Mucosa moist, no oral thrush, no exudate. Neck:     No nuchal rigidity. Back:     No CVA tenderness. Abdomen:    Soft nontender. Extremities:    No pretibial pitting edema, no calf muscle tenderness. Skin:     No rashes, no lesions.     BP: 114/79 Weight: 146 lb (66.2 kg) Height: 5' 3\" (160 cm) Pulse: 105     Body mass index is 25.86 kg/m². Urine dipstick:  Glucose : Negative   Albumin:  Negative       An ultrasound evaluation was done in our office today. Please refer to the enclosed copy of the ultrasound report for further information. IMPRESSION:  1. A  33w4d  intrauterine gestation. 2. Complex thrombophilia , on treatment with a thromboprophylaxis dose of Lovenox. 3. Negative personal history of thromboembolic disease. 4. Tested because of multiple family members with thromboembolic disease. 5. History of PIH in prior pregnancy necessitating induction of labor at 37 weeks. 6. Tobacco abuse (Vapes), during pregnancy. 7. Bipolar disorder. 8. First trimester exposure to BuSpar. 9. Pyelonephritis during this pregnancy treated in the ER on 10/29/2018. 10. Declined prenatal genetic testing for fetal chromosomal abnormalities. RECOMMENDATIONS/PLAN:  I discussed with the patient the following points:    1. The benefits and limitations of ultrasound in prenatal diagnosis. Some defects might not always be seen by ultrasound. Estimated incidence of these defects in the general population is 2- 4%. 2. No structural  anomalies are noted. Only genetic amniocentesis can rule out fetal chromosome anomalies. Normal ultrasound does not. 3. The size of her baby is appropriate for gestational age. 4. Exposure to different medications such as Buspar in the first trimester of pregnancy increases the likelihood of having a baby with birth defects. No structural anomalies are seen today by ultrasound . Not all birth defects, however, can be seen by ultrasound, and some defects might show on ultrasounds done later in pregnancy. 5. The ill effects of tobacco abuse ( Vapes), during pregnancy and its association with an increased risk of intrauterine growth restriction, placental abruption, and pregnancy loss.  In addition to the increased risk of  morbidity and mortality there is an increased risk of sudden infant death syndrome in the households where people smoke. I recommend that she stops smoking. 6. She is to stop the baby aspirin at 36 weeks of gestation. 7. She is to continue the treatment with Lovenox. I recommend calcium supplementation (I told her to take three tablets of calcium carbonate (TUMS), because the association between long term use of heparin and osteopenia. 8. Fetal well-being was confirmed today. The amount of fluid around baby is normal.  The Biophysical profile score of 8/8 is reassuring, and the umbilical artery Doppler studies are normal.  9. She should monitor fetal well-being at home by counting movements after dinner. Her baby should  move 10 times in 2 hours; otherwise, she should call your office immediately. She is also to call, if she develops any headaches, blurred vision, abdominal pain, bleeding, or spotting, which are signs of preeclampsia. 10. She is to continue to follow with you in your office for ongoing obstetric care. 11. She should continue to be followed up in your office with nonstress test every 3 to 4 days. 12. Should be seen in our MFM office in 3 weeks to check on fetal well-being anatomy and growth. Thank you again, doctor, for allowing us to be of service to your patient. If I can be of further assistance, please do not hesitate to call. Sincerely,        Tavares Johnson M.D., FACOG    [place] OFFICE OUTPATIENT VISIT 98 MINUTES [72666]  US OB Follow Up Transabdominal Approach [PFL341 Custom]  US Fetal Biophysical Profile WO Non Stress Testing [26198 Custom]    **This report has been created using voice recognition software.  It may contain minor errors     which are inherent in voice recognition technology**

## 2019-06-20 NOTE — LETTER
Body mass index is 25.86 kg/m². Urine dipstick:  Glucose : Negative   Albumin:  Negative       An ultrasound evaluation was done in our office today. Please refer to the enclosed copy of the ultrasound report for further information. IMPRESSION:  1. A  33w4d  intrauterine gestation. 2. Complex thrombophilia , on treatment with a thromboprophylaxis dose of Lovenox. 3. Negative personal history of thromboembolic disease. 4. Tested because of multiple family members with thromboembolic disease. 5. History of PIH in prior pregnancy necessitating induction of labor at 37 weeks. 6. Tobacco abuse (Vapes), during pregnancy. 7. Bipolar disorder. 8. First trimester exposure to BuSpar. 9. Pyelonephritis during this pregnancy treated in the ER on 10/29/2018. 10. Declined prenatal genetic testing for fetal chromosomal abnormalities. RECOMMENDATIONS/PLAN:  I discussed with the patient the following points:    1. The benefits and limitations of ultrasound in prenatal diagnosis. Some defects might not always be seen by ultrasound. Estimated incidence of these defects in the general population is 2- 4%. 2. No structural  anomalies are noted. Only genetic amniocentesis can rule out fetal chromosome anomalies. Normal ultrasound does not. 3. The size of her baby is appropriate for gestational age. 4. Exposure to different medications such as Buspar in the first trimester of pregnancy increases the likelihood of having a baby with birth defects. No structural anomalies are seen today by ultrasound . Not all birth defects, however, can be seen by ultrasound, and some defects might show on ultrasounds done later in pregnancy. 5. The ill effects of tobacco abuse ( Vapes), during pregnancy and its association with an increased risk of intrauterine growth restriction, placental abruption, and pregnancy loss.  In addition to the increased risk of  morbidity and mortality there is an increased risk of sudden

## 2019-06-20 NOTE — PATIENT INSTRUCTIONS
You might be having an NST at your next appt. Please eat a large snack or breakfast before coming to office. Thank youCall your primary obstetrician with bleeding, leaking of fluid, abdominal tenderness, headache, blurry vision, epigastric pain and increased urinary frequency. Any questions contact [facility] at [phone]. If you are experiencing an emergency and need immediate help, call 911 or go to go emergency room or labor and delivery. Do kick counts after dinner. Call your primary obstetrician if less than 10 kicks in 2 hours after dinner. Call your primary obstetrician with bleeding, leaking of fluid, abdominal tenderness, headache, blurry vision, epigastric pain and increased urinary frequency. if you are sick, not feeling well or have an infectious process going on please reschedule your appointment by calling [phone]. Also if any family members are not feeling well, please do not bring them to your appointment. We appreciate your cooperation. We are doing this in order to protect our pregnant mothers+ their babies. Patient Education        Clotting Factor Deficiencies: Care Instructions  Your Care Instructions    Clotting factors are substances in the blood that help stop bleeding after a cut or injury. They also prevent sudden bleeding. In people who have clotting factor problems, the clotting factors don't work right or, in some cases, are missing. When blood does not clot well, even minor injuries can cause serious bleeding. This can lead to blood loss, injury to internal organs, or damage to muscles or joints. Several conditions, including hemophilia, can make it hard for the blood to clot. Your doctor can treat you by giving you replacement clotting factors. You also may take medicine to prevent bleeding. You may often have clotting factors transfused into a vein to prevent bleeding, or you may get them as needed. You may eventually learn to do this at home.  You can also try to prevent injuries that can cause you to bleed. Follow-up care is a key part of your treatment and safety. Be sure to make and go to all appointments, and call your doctor if you are having problems. It's also a good idea to know your test results and keep a list of the medicines you take. How can you care for yourself at home? · Take your medicines exactly as prescribed. Call your doctor if you think you are having a problem with your medicine. You will get more details on the specific medicines your doctor prescribes. · Stay at a healthy body weight. If you are overweight, the additional stress on joints can trigger bleeding. · Exercise safely. Avoid contact sports. Swim or walk to avoid excess pressure on your joints. Check with your doctor before doing activities that put you at high risk for falls, such as riding a bike. · Brush and floss your teeth daily. This may help you avoid problems that could lead to having a tooth pulled. · Avoid aspirin and nonsteroidal anti-inflammatory drugs (NSAIDs), such as ibuprofen (Advil, Motrin) or naproxen (Aleve). They can increase the chance of bleeding. · Take pain medicines exactly as directed. ? If the doctor gave you a prescription medicine for pain, take it as prescribed. ? If you are not taking a prescription pain medicine, ask your doctor if you can take an over-the-counter medicine. · Take care to prevent accidents at home:  ? Make sure rugs are tacked down so you do not slip. ? Keep furniture with sharp edges out of pathways. ? Use nonskid floor wax. ? Wipe up spills quickly. ? If you live in an area that gets snow and ice in the winter, sprinkle salt on steps and sidewalks. ? Avoid loose-fitting shoes. You might lose your balance and fall. · Wear medical alert jewelry that lists your clotting problem. You can buy this at most drugstores. When should you call for help? Call 911 anytime you think you may need emergency care.  For example, call if:    · You can live in the vagina and rectum. It can make your baby sick after birth. If you test positive, you will get antibiotics during labor. These will keep your baby from getting the bacteria. You may want to talk with your doctor about banking your baby's umbilical cord blood. This is the blood left in the cord after birth. If you want to save this blood, you must arrange it ahead of time. You can't decide at the last minute. If you haven't already had the Tdap shot during this pregnancy, talk to your doctor about getting it. It will help protect your  against pertussis infection. Follow-up care is a key part of your treatment and safety. Be sure to make and go to all appointments, and call your doctor if you are having problems. It's also a good idea to know your test results and keep a list of the medicines you take. How can you care for yourself at home? Ease hemorrhoids  · Get more liquids, fruits, vegetables, and fiber in your diet. This will help keep your stools soft. · Avoid sitting for too long. Lie on your left side several times a day. · Clean yourself with soft, moist toilet paper. Or you can use witch hazel pads or personal hygiene pads. · If you are uncomfortable, try ice packs. Or you can sit in a warm sitz bath. Do these for 20 minutes at a time, as needed. · Use hydrocortisone cream for pain and itching. Two examples are Anusol and Preparation H Hydrocortisone. · Ask your doctor about taking an over-the-counter stool softener. Consider breastfeeding  · Experts recommend that women breastfeed for 1 year or longer. Breast milk is the perfect food for babies. · Breast milk is easier for babies to digest than formula. And it is always available, just the right temperature, and free. · Breast milk may help protect your child from some health problems.  babies are less likely than formula-fed babies to:  ? Get ear infections, colds, diarrhea, and pneumonia. ?  Be obese or get diabetes later in life. · Women who breastfeed have less bleeding after the birth. Their uteruses also shrink back faster. · Some women who breastfeed lose weight faster. Making milk burns calories. · Breastfeeding can lower your risk of breast cancer, ovarian cancer, and osteoporosis. Decide about circumcision for boys  · As you make this decision, it may help to think about your personal, Mandaen, and family traditions. You get to decide if you will keep your son's penis natural or if he will be circumcised. · If you decide that you would like to have your baby circumcised, talk with your doctor. You can share your concerns about pain. And you can discuss your preferences for anesthesia. Where can you learn more? Go to https://E-Ductionpepiceweb.[employer]. org and sign in to your testbirds account. Enter L317 in the [employer] Social box to learn more about \"Weeks 32 to 34 of Your Pregnancy: Care Instructions. \"     If you do not have an account, please click on the \"Sign Up Now\" link. Current as of: September 5, 2018  Content Version: 12.0  © 5180-9522 Healthwise, Incorporated. Care instructions adapted under license by [facility] ([facility]). If you have questions about a medical condition or this instruction, always ask your healthcare professional. [place] [address] any warranty or liability for your use of this information. Patient Education        Learning About When to Call Your Doctor During Pregnancy (After 20 Weeks)  Your Care Instructions  It's common to have concerns about what might be a problem during pregnancy. Although most pregnant women don't have any serious problems, it's important to know when to call your doctor if you have certain symptoms or signs of labor. These are general suggestions. Your doctor may give you some more information about when to call. When to call your doctor (after 20 weeks)  Call 911 anytime you think you may need emergency care.  For example, call if:  · You have severe vaginal bleeding. · You have sudden, severe pain in your belly. · You passed out (lost consciousness). · You have a seizure. · You see or feel the umbilical cord. · You think you are about to deliver your baby and can't make it safely to the hospital.  Call your doctor now or seek immediate medical care if:  · You have vaginal bleeding. · You have belly pain. · You have a fever. · You have symptoms of preeclampsia, such as:  ? Sudden swelling of your face, hands, or feet. ? New vision problems (such as dimness or blurring). ? A severe headache. · You have a sudden release of fluid from your vagina. (You think your water broke.)  · You think that you may be in labor. This means that you've had at least 4 contractions within 20 minutes or at least 8 contractions in an hour. · You notice that your baby has stopped moving or is moving much less than normal.  · You have symptoms of a urinary tract infection. These may include:  ? Pain or burning when you urinate. ? A frequent need to urinate without being able to pass much urine. ? Pain in the flank, which is just below the rib cage and above the waist on either side of the back. ? Blood in your urine. Watch closely for changes in your health, and be sure to contact your doctor if:  · You have vaginal discharge that smells bad. · You have skin changes, such as:  ? A rash. ? Itching. ? Yellow color to your skin. · You have other concerns about your pregnancy. If you have labor signs at 37 weeks or more  If you have signs of labor at 37 weeks or more, your doctor may tell you to call when your labor becomes more active. Symptoms of active labor include:  · Contractions that are regular. · Contractions that are less than 5 minutes apart. · Contractions that are hard to talk through. Follow-up care is a key part of your treatment and safety.  Be sure to make and go to all appointments, and call your doctor if you you count in the morning until you get to 10 movements. Then you can quit for that day and start again the next day. · Pick your baby's most active time of day to count. This may be any time from morning to evening. · If you do not feel 10 movements in an hour, your baby may be sleeping. Wait for the next hour and count again. When should you call for help? Call your doctor now or seek immediate medical care if:    · You noticed that your baby has stopped moving or is moving much less than normal.    Watch closely for changes in your health, and be sure to contact your doctor if you have any problems. Where can you learn more? Go to https://Foradian.[employer]. org and sign in to your [employer]o account. Enter P784 in the Ninjathat box to learn more about \"Counting Your Baby's Kicks: Care Instructions. \"     If you do not have an account, please click on the \"Sign Up Now\" link. Current as of: September 5, 2018  Content Version: 12.0  © 8636-1443 Healthwise, Incorporated. Care instructions adapted under license by [facility] ([facility]). If you have questions about a medical condition or this instruction, always ask your healthcare professional. [address] any warranty or liability for your use of this information.

## 2019-06-20 NOTE — PROGRESS NOTES
Pt denies bleeding,lof,ctxPt states good fetal movement. Kick counts encouraged. All questions answered+ information confirmed by pt

## 2019-07-09 ENCOUNTER — HOSPITAL ENCOUNTER (OUTPATIENT)
Age: 24
Discharge: HOME | End: 2019-07-09
Attending: OBSTETRICS & GYNECOLOGY | Admitting: OBSTETRICS & GYNECOLOGY
Payer: COMMERCIAL

## 2019-07-09 VITALS
HEART RATE: 98 BPM | DIASTOLIC BLOOD PRESSURE: 60 MMHG | TEMPERATURE: 98.4 F | HEIGHT: 63 IN | BODY MASS INDEX: 26.4 KG/M2 | WEIGHT: 149 LBS | SYSTOLIC BLOOD PRESSURE: 115 MMHG | RESPIRATION RATE: 16 BRPM

## 2019-07-09 PROBLEM — Z3A.36 36 WEEKS GESTATION OF PREGNANCY: Status: ACTIVE | Noted: 2019-07-09

## 2019-07-09 LAB
BILIRUBIN URINE: NEGATIVE
BLOOD, URINE: NEGATIVE
CLARITY: CLEAR
COLOR: YELLOW
GLUCOSE URINE: 100 MG/DL
KETONES, URINE: NEGATIVE MG/DL
LEUKOCYTE ESTERASE, URINE: NEGATIVE
NITRITE, URINE: NEGATIVE
PH UA: 6.5 (ref 5–9)
PROTEIN UA: NEGATIVE MG/DL
SPECIFIC GRAVITY UA: <=1.005 (ref 1–1.03)
UROBILINOGEN, URINE: 0.2 E.U./DL

## 2019-07-09 PROCEDURE — 99214 OFFICE O/P EST MOD 30 MIN: CPT | Performed by: PHYSICIAN ASSISTANT

## 2019-07-09 PROCEDURE — 81003 URINALYSIS AUTO W/O SCOPE: CPT

## 2019-07-09 PROCEDURE — 6370000000 HC RX 637 (ALT 250 FOR IP): Performed by: OBSTETRICS & GYNECOLOGY

## 2019-07-09 PROCEDURE — 99212 OFFICE O/P EST SF 10 MIN: CPT

## 2019-07-09 RX ORDER — ACETAMINOPHEN 500 MG
1000 TABLET ORAL ONCE
Status: COMPLETED | OUTPATIENT
Start: 2019-07-09 | End: 2019-07-09

## 2019-07-09 RX ADMIN — ACETAMINOPHEN 1000 MG: 500 TABLET ORAL at 14:07

## 2019-07-09 ASSESSMENT — PAIN SCALES - GENERAL: PAINLEVEL_OUTOF10: 5

## 2019-07-09 NOTE — H&P
7.5 MG tablet, Take 1 tablet by mouth 2 times daily  enoxaparin (LOVENOX) 40 MG/0.4ML injection, Inject 0.4 mLs into the skin daily  Prenatal Vit-Fe Fumarate-FA (PRENATAL PLUS PO), Take 1 tablet by mouth daily     Allergies:  Zyrtec [cetirizine]    Social History:  Tobacco: cigarette smoker, vapes  ETOH: Denies  Drugs: Denies    Review of Systems:   ROS:  Const: No fever, chills, night sweats, no recent unexplained weight gain/loss  HEENT: No blurred vision, double vision; no ear problems; no sore throat, congestion; no running nose. Resp: No cough, no sputum, no pleuritic chest pain, no sob  Cardio: No chest pain, no exertional dyspnea, no PND, no orthopnea, no palpitation, no leg swelling. GI: (+) abdominal pain; No dysphagia, no reflux; no n/v; no c/d.  No hematochezia    : No dysuria, no frequency, hesitancy; no hematuria  MSK: no joint pain, no myalgia, no change in ROM  Neuro: (+) headache;  no focal weakness in extremities, no slurred speech, no double vision, no numbness or tingling in extremities  Endo: no heat/cold intolerance, no polyphagia, polydipsia or polyuria  Hem: no increased bleeding, no bruising, no lymphadenopathy  Skin: no skin changes  Psych: no depressed mood, no suicidal ideation  Pertinent +'s & -'s addressed I HPI    PHYSICAL EXAM:  /74   Pulse 109   Temp 98.4 °F (36.9 °C) (Oral)   Resp 16   Ht 5' 3\" (1.6 m)   Wt 149 lb (67.6 kg)   LMP 10/28/2018 (Approximate)   BMI 26.39 kg/m²      Patient Vitals for the past 8 hrs:   BP Temp Temp src Pulse Resp Height Weight   07/09/19 1325 106/67 -- -- 103 -- -- --   07/09/19 1310 104/65 -- -- 96 -- -- --   07/09/19 1255 115/69 -- -- 101 -- -- --   07/09/19 1245 -- -- -- -- -- 5' 3\" (1.6 m) 149 lb (67.6 kg)   07/09/19 1240 117/74 98.4 °F (36.9 °C) Oral 109 16 -- --       General Appearance: awake, alert, cooperative, no apparent distress, and appears stated age  Lungs: clear to auscultation  Heart: regular rate and rhythm and no murmur

## 2019-07-11 ENCOUNTER — ROUTINE PRENATAL (OUTPATIENT)
Dept: OBGYN CLINIC | Age: 24
End: 2019-07-11
Payer: COMMERCIAL

## 2019-07-11 VITALS
WEIGHT: 152 LBS | SYSTOLIC BLOOD PRESSURE: 126 MMHG | HEIGHT: 63 IN | HEART RATE: 114 BPM | BODY MASS INDEX: 26.93 KG/M2 | DIASTOLIC BLOOD PRESSURE: 81 MMHG

## 2019-07-11 DIAGNOSIS — Z79.01 LONG TERM CURRENT USE OF ANTICOAGULANT THERAPY: ICD-10-CM

## 2019-07-11 DIAGNOSIS — O99.343 BIPOLAR DISEASE DURING PREGNANCY, THIRD TRIMESTER (HCC): ICD-10-CM

## 2019-07-11 DIAGNOSIS — O09.893 PRIOR PREGNANCY COMPLICATED BY PIH, ANTEPARTUM, THIRD TRIMESTER: ICD-10-CM

## 2019-07-11 DIAGNOSIS — O99.333 TOBACCO SMOKING COMPLICATING PREGNANCY, THIRD TRIMESTER: ICD-10-CM

## 2019-07-11 DIAGNOSIS — Z3A.36 36 WEEKS GESTATION OF PREGNANCY: ICD-10-CM

## 2019-07-11 DIAGNOSIS — F31.9 BIPOLAR DISEASE DURING PREGNANCY, THIRD TRIMESTER (HCC): ICD-10-CM

## 2019-07-11 DIAGNOSIS — O99.113 BLOOD COAGULATION DISORDER COMPLICATING PREGNANCY, THIRD TRIMESTER (HCC): Primary | ICD-10-CM

## 2019-07-11 DIAGNOSIS — D68.9 BLOOD COAGULATION DISORDER COMPLICATING PREGNANCY, THIRD TRIMESTER (HCC): Primary | ICD-10-CM

## 2019-07-11 LAB
GLUCOSE URINE, POC: NORMAL
PROTEIN UA: NEGATIVE

## 2019-07-11 PROCEDURE — 81002 URINALYSIS NONAUTO W/O SCOPE: CPT | Performed by: OBSTETRICS & GYNECOLOGY

## 2019-07-11 PROCEDURE — 1036F TOBACCO NON-USER: CPT | Performed by: OBSTETRICS & GYNECOLOGY

## 2019-07-11 PROCEDURE — G8419 CALC BMI OUT NRM PARAM NOF/U: HCPCS | Performed by: OBSTETRICS & GYNECOLOGY

## 2019-07-11 PROCEDURE — 76816 OB US FOLLOW-UP PER FETUS: CPT | Performed by: OBSTETRICS & GYNECOLOGY

## 2019-07-11 PROCEDURE — 76819 FETAL BIOPHYS PROFIL W/O NST: CPT | Performed by: OBSTETRICS & GYNECOLOGY

## 2019-07-11 PROCEDURE — 99211 OFF/OP EST MAY X REQ PHY/QHP: CPT | Performed by: OBSTETRICS & GYNECOLOGY

## 2019-07-11 PROCEDURE — G8427 DOCREV CUR MEDS BY ELIG CLIN: HCPCS | Performed by: OBSTETRICS & GYNECOLOGY

## 2019-07-11 PROCEDURE — 99213 OFFICE O/P EST LOW 20 MIN: CPT | Performed by: OBSTETRICS & GYNECOLOGY

## 2019-07-11 NOTE — LETTER
Albumin:  Negative       An ultrasound evaluation was done in our office today. Please refer to the enclosed copy of the ultrasound report for further information. Chart and Lab Work Review:  I reviewed with the patient the result of the:  · Two hour GTT collected on 5/22/2019 that ruled out gestational diabetes. IMPRESSION:  1. A  36w4d  intrauterine gestation. 2. Complex thrombophilia , on treatment with a thromboprophylaxis dose of Lovenox. 3. Negative personal history of thromboembolic disease. 4. Tested because of multiple family members with thromboembolic disease. 5. History of PIH in prior pregnancy necessitating induction of labor at 37 weeks. 6. Tobacco abuse (Vapes), during pregnancy. 7. Bipolar disorder. 8. First trimester exposure to BuSpar. 9. Pyelonephritis during this pregnancy treated in the ER on 10/29/2018. 10. Declined prenatal genetic testing for fetal chromosomal abnormalities. RECOMMENDATIONS/PLAN:  I discussed with the patient the following points:    1. The benefits and limitations of ultrasound in prenatal diagnosis. Some defects might not always be seen by ultrasound. Estimated incidence of these defects in the general population is 2- 4%. 2. No structural  anomalies are noted. Only genetic amniocentesis can rule out fetal chromosome anomalies. Normal ultrasound does not. 3. The size of her baby is appropriate for gestational age. 4. Exposure to different medications such as Buspar in the first trimester of pregnancy increases the likelihood of having a baby with birth defects. No structural anomalies are seen today by ultrasound . Not all birth defects, however, can be seen by ultrasound, and some defects might show on ultrasounds done later in pregnancy.   5. The ill effects of tobacco abuse ( Vapes), during pregnancy and its association with an increased risk of intrauterine growth restriction,

## 2019-07-11 NOTE — PATIENT INSTRUCTIONS
You might be having an NST at your next appt. Please eat a large snack or breakfast before coming to office. Thank youCall your primary obstetrician with bleeding, leaking of fluid, abdominal tenderness, headache, blurry vision, epigastric pain and increased urinary frequency. Any questions contact Rl at [phone]. If you are experiencing an emergency and need immediate help, call 911 or go to go emergency room or labor and delivery. Do kick counts after dinner. Call your primary obstetrician if less than 10 kicks in 2 hours after dinner. Call your primary obstetrician with bleeding, leaking of fluid, abdominal tenderness, headache, blurry vision, epigastric pain and increased urinary frequency. if you are sick, not feeling well or have an infectious process going on please reschedule your appointment by calling [phone]. Also if any family members are not feeling well, please do not bring them to your appointment. We appreciate your cooperation. We are doing this in order to protect our pregnant mothers+ their babies. Patient Education        Clotting Factor Deficiencies: Care Instructions  Your Care Instructions    Clotting factors are substances in the blood that help stop bleeding after a cut or injury. They also prevent sudden bleeding. In people who have clotting factor problems, the clotting factors don't work right or, in some cases, are missing. When blood does not clot well, even minor injuries can cause serious bleeding. This can lead to blood loss, injury to internal organs, or damage to muscles or joints. Several conditions, including hemophilia, can make it hard for the blood to clot. Your doctor can treat you by giving you replacement clotting factors. You also may take medicine to prevent bleeding. You may often have clotting factors transfused into a vein to prevent bleeding, or you may get them as needed. You may eventually learn to do this at home.  You can also try to prevent move more at certain times of the day. When you are active, you may notice less kicking than when you are resting. At your prenatal visits, your doctor will ask whether the baby is active. In your last trimester, your doctor may ask you to count the number of times you feel your baby move. Follow-up care is a key part of your treatment and safety. Be sure to make and go to all appointments, and call your doctor if you are having problems. It's also a good idea to know your test results and keep a list of the medicines you take. How do you count fetal kicks? · A common method of checking your baby's movement is to count the number of kicks or moves you feel in 1 hour. Ten movements (such as kicks, flutters, or rolls) in 1 hour are normal. Some doctors suggest that you count in the morning until you get to 10 movements. Then you can quit for that day and start again the next day. · Pick your baby's most active time of day to count. This may be any time from morning to evening. · If you do not feel 10 movements in an hour, your baby may be sleeping. Wait for the next hour and count again. When should you call for help? Call your doctor now or seek immediate medical care if:    · You noticed that your baby has stopped moving or is moving much less than normal.    Watch closely for changes in your health, and be sure to contact your doctor if you have any problems. Where can you learn more? Go to [email or web address] org and sign in to your [employer] account. Enter I708 in the [facility] box to learn more about \"Counting Your Baby's Kicks: Care Instructions. \"     If you do not have an account, please click on the \"Sign Up Now\" link. Current as of: September 5, 2018  Content Version: 12.0  © 7700-3541 Healthwise, Incorporated. Care instructions adapted under license by [facility] [place] ([facility]).  If you have questions about a medical condition or this instruction, always ask your healthcare professional. [address] any warranty or liability for your use of this information.

## 2019-07-25 ENCOUNTER — HOSPITAL ENCOUNTER (OUTPATIENT)
Age: 24
Setting detail: OBSERVATION
Discharge: HOME | DRG: 560 | End: 2019-07-25
Attending: OBSTETRICS & GYNECOLOGY | Admitting: OBSTETRICS & GYNECOLOGY
Payer: COMMERCIAL

## 2019-07-25 VITALS
HEART RATE: 110 BPM | HEIGHT: 63 IN | DIASTOLIC BLOOD PRESSURE: 78 MMHG | BODY MASS INDEX: 26.57 KG/M2 | RESPIRATION RATE: 16 BRPM | TEMPERATURE: 98.9 F | SYSTOLIC BLOOD PRESSURE: 113 MMHG

## 2019-07-25 PROBLEM — Z64.1 MULTIPARITY: Status: ACTIVE | Noted: 2019-07-25

## 2019-07-25 LAB
AMNISURE, POC: NEGATIVE
Lab: NORMAL
NEGATIVE QC PASS/FAIL: NORMAL
POSITIVE QC PASS/FAIL: NORMAL

## 2019-07-25 PROCEDURE — G0378 HOSPITAL OBSERVATION PER HR: HCPCS

## 2019-07-25 PROCEDURE — G0379 DIRECT REFER HOSPITAL OBSERV: HCPCS

## 2019-07-25 PROCEDURE — 84112 EVAL AMNIOTIC FLUID PROTEIN: CPT

## 2019-07-25 NOTE — H&P
orthopnea, no palpitation, no leg swelling. GI: No dysphagia, no reflux; no abdominal pain, no n/v; no c/d. No hematochezia    : No dysuria, no frequency, hesitancy; no hematuria  MSK: no joint pain, no myalgia, no change in ROM  Neuro: no focal weakness in extremities, no slurred speech, no double vision, no numbness or tingling in extremities  Endo: no heat/cold intolerance, no polyphagia, polydipsia or polyuria  Hem: no increased bleeding, no bruising, no lymphadenopathy  Skin: no skin changes  Psych: no depressed mood, no suicidal ideation    Social History:     reports that she quit smoking about 15 months ago. Her smoking use included cigarettes. She has a 3.00 pack-year smoking history. She has never used smokeless tobacco. She reports that she drank alcohol. She reports that she does not use drugs. Medications Prior to Admission:  Medications Prior to Admission: enoxaparin (LOVENOX) 40 MG/0.4ML injection, Inject 0.4 mLs into the skin daily  famotidine (PEPCID) 20 MG tablet, Take 1 tablet by mouth daily  Calcium Carbonate Antacid (TUMS PO), Take by mouth  busPIRone (BUSPAR) 7.5 MG tablet, Take 1 tablet by mouth 2 times daily  Prenatal Vit-Fe Fumarate-FA (PRENATAL PLUS PO), Take 1 tablet by mouth daily    Allergies:  Zyrtec [cetirizine]    PHYSICAL EXAM:  /78   Pulse 110   Temp 98.9 °F (37.2 °C) (Oral)   Resp 16   Ht 5' 3\" (1.6 m)   LMP 10/28/2018 (Approximate)   BMI 26.57 kg/m²   General appearance: Comfortable  Lungs:  CTA   Heart:  Regular Rhythm  Abdomen:  Soft, non-tender, gravid  Fetal heart rate:  Baseline Heart Rate 120, accelerations: present  decelerations:  few mild variables    Contraction frequency:  none  Membranes:  Intact,amnisure neg      ASSESSMENT     IUP at 38 weeks. As above         Plan: discussed with dr Lele Velarde. States patient may be discharged. prec given.           Electronically signed by Joey Andrews MD on 7/25/2019 at 1:12 PM

## 2019-07-28 ENCOUNTER — HOSPITAL ENCOUNTER (INPATIENT)
Age: 24
LOS: 4 days | Discharge: HOME | DRG: 560 | End: 2019-08-01
Attending: OBSTETRICS & GYNECOLOGY | Admitting: OBSTETRICS & GYNECOLOGY
Payer: COMMERCIAL

## 2019-07-28 ENCOUNTER — APPOINTMENT (OUTPATIENT)
Dept: LABOR AND DELIVERY | Age: 24
DRG: 560 | End: 2019-07-28
Payer: COMMERCIAL

## 2019-07-28 PROBLEM — Z34.90 NORMAL PREGNANCY, UNSPECIFIED TRIMESTER: Status: ACTIVE | Noted: 2019-07-28

## 2019-07-28 LAB
AMPHETAMINE SCREEN, URINE: NOT DETECTED
APTT: 28.2 SEC (ref 24.5–35.1)
BARBITURATE SCREEN URINE: NOT DETECTED
BENZODIAZEPINE SCREEN, URINE: NOT DETECTED
CANNABINOID SCREEN URINE: NOT DETECTED
COCAINE METABOLITE SCREEN URINE: NOT DETECTED
HCT VFR BLD CALC: 29.8 % (ref 34–48)
HEMOGLOBIN: 9.4 G/DL (ref 11.5–15.5)
INR BLD: 1
MCH RBC QN AUTO: 23.7 PG (ref 26–35)
MCHC RBC AUTO-ENTMCNC: 31.5 % (ref 32–34.5)
MCV RBC AUTO: 75.1 FL (ref 80–99.9)
METHADONE SCREEN, URINE: NOT DETECTED
OPIATE SCREEN URINE: NOT DETECTED
PDW BLD-RTO: 15 FL (ref 11.5–15)
PHENCYCLIDINE SCREEN URINE: NOT DETECTED
PLATELET # BLD: 229 E9/L (ref 130–450)
PMV BLD AUTO: 10.3 FL (ref 7–12)
PROPOXYPHENE SCREEN: NOT DETECTED
PROTHROMBIN TIME: 11.2 SEC (ref 9.3–12.4)
RBC # BLD: 3.97 E12/L (ref 3.5–5.5)
WBC # BLD: 9.2 E9/L (ref 4.5–11.5)

## 2019-07-28 PROCEDURE — 86901 BLOOD TYPING SEROLOGIC RH(D): CPT

## 2019-07-28 PROCEDURE — 85027 COMPLETE CBC AUTOMATED: CPT

## 2019-07-28 PROCEDURE — 86850 RBC ANTIBODY SCREEN: CPT

## 2019-07-28 PROCEDURE — 6370000000 HC RX 637 (ALT 250 FOR IP): Performed by: OBSTETRICS & GYNECOLOGY

## 2019-07-28 PROCEDURE — 85730 THROMBOPLASTIN TIME PARTIAL: CPT

## 2019-07-28 PROCEDURE — 36415 COLL VENOUS BLD VENIPUNCTURE: CPT

## 2019-07-28 PROCEDURE — 1220000001 HC SEMI PRIVATE L&D R&B

## 2019-07-28 PROCEDURE — 85610 PROTHROMBIN TIME: CPT

## 2019-07-28 PROCEDURE — 86900 BLOOD TYPING SEROLOGIC ABO: CPT

## 2019-07-28 PROCEDURE — 80307 DRUG TEST PRSMV CHEM ANLYZR: CPT

## 2019-07-28 RX ORDER — SODIUM CHLORIDE 0.9 % (FLUSH) 0.9 %
10 SYRINGE (ML) INJECTION PRN
Status: DISCONTINUED | OUTPATIENT
Start: 2019-07-28 | End: 2019-07-31

## 2019-07-28 RX ORDER — SODIUM CHLORIDE, SODIUM LACTATE, POTASSIUM CHLORIDE, CALCIUM CHLORIDE 600; 310; 30; 20 MG/100ML; MG/100ML; MG/100ML; MG/100ML
INJECTION, SOLUTION INTRAVENOUS CONTINUOUS
Status: DISCONTINUED | OUTPATIENT
Start: 2019-07-28 | End: 2019-07-31

## 2019-07-28 RX ORDER — ONDANSETRON 2 MG/ML
4 INJECTION INTRAMUSCULAR; INTRAVENOUS EVERY 6 HOURS PRN
Status: DISCONTINUED | OUTPATIENT
Start: 2019-07-28 | End: 2019-07-30 | Stop reason: SDUPTHER

## 2019-07-28 RX ORDER — DOCUSATE SODIUM 100 MG/1
100 CAPSULE, LIQUID FILLED ORAL 2 TIMES DAILY
Status: DISCONTINUED | OUTPATIENT
Start: 2019-07-28 | End: 2019-07-31

## 2019-07-28 RX ADMIN — DINOPROSTONE 10 MG: 10 INSERT VAGINAL at 22:25

## 2019-07-29 ENCOUNTER — ANESTHESIA (OUTPATIENT)
Dept: LABOR AND DELIVERY | Age: 24
DRG: 560 | End: 2019-07-29
Payer: COMMERCIAL

## 2019-07-29 ENCOUNTER — ANESTHESIA EVENT (OUTPATIENT)
Dept: LABOR AND DELIVERY | Age: 24
DRG: 560 | End: 2019-07-29
Payer: COMMERCIAL

## 2019-07-29 LAB
ABO/RH: NORMAL
ANTIBODY SCREEN: NORMAL

## 2019-07-29 PROCEDURE — 2580000003 HC RX 258: Performed by: OBSTETRICS & GYNECOLOGY

## 2019-07-29 PROCEDURE — 6370000000 HC RX 637 (ALT 250 FOR IP): Performed by: OBSTETRICS & GYNECOLOGY

## 2019-07-29 PROCEDURE — 6370000000 HC RX 637 (ALT 250 FOR IP)

## 2019-07-29 PROCEDURE — 1220000001 HC SEMI PRIVATE L&D R&B

## 2019-07-29 PROCEDURE — 6360000002 HC RX W HCPCS: Performed by: OBSTETRICS & GYNECOLOGY

## 2019-07-29 RX ORDER — FAMOTIDINE 20 MG/1
TABLET, FILM COATED ORAL
Status: COMPLETED
Start: 2019-07-29 | End: 2019-07-29

## 2019-07-29 RX ORDER — ACETAMINOPHEN 325 MG/1
TABLET ORAL
Status: COMPLETED
Start: 2019-07-29 | End: 2019-07-29

## 2019-07-29 RX ORDER — ACETAMINOPHEN 325 MG/1
650 TABLET ORAL EVERY 4 HOURS PRN
Status: DISCONTINUED | OUTPATIENT
Start: 2019-07-29 | End: 2019-07-31

## 2019-07-29 RX ORDER — FAMOTIDINE 20 MG/1
20 TABLET, FILM COATED ORAL DAILY
Status: DISCONTINUED | OUTPATIENT
Start: 2019-07-29 | End: 2019-07-31

## 2019-07-29 RX ADMIN — Medication 1 MILLI-UNITS/MIN: at 11:12

## 2019-07-29 RX ADMIN — DINOPROSTONE 10 MG: 10 INSERT VAGINAL at 19:45

## 2019-07-29 RX ADMIN — ACETAMINOPHEN 650 MG: 325 TABLET ORAL at 07:29

## 2019-07-29 RX ADMIN — ONDANSETRON 4 MG: 2 INJECTION INTRAMUSCULAR; INTRAVENOUS at 10:18

## 2019-07-29 RX ADMIN — FAMOTIDINE 20 MG: 20 TABLET ORAL at 01:23

## 2019-07-29 RX ADMIN — ACETAMINOPHEN 650 MG: 325 TABLET ORAL at 01:22

## 2019-07-29 RX ADMIN — ACETAMINOPHEN 650 MG: 325 TABLET ORAL at 13:36

## 2019-07-29 RX ADMIN — SODIUM CHLORIDE, POTASSIUM CHLORIDE, SODIUM LACTATE AND CALCIUM CHLORIDE 125 ML/HR: 600; 310; 30; 20 INJECTION, SOLUTION INTRAVENOUS at 07:29

## 2019-07-29 ASSESSMENT — PAIN SCALES - GENERAL
PAINLEVEL_OUTOF10: 5

## 2019-07-29 ASSESSMENT — PAIN DESCRIPTION - DESCRIPTORS: DESCRIPTORS: HEADACHE;CRAMPING

## 2019-07-29 ASSESSMENT — PAIN DESCRIPTION - LOCATION: LOCATION: HEAD

## 2019-07-29 NOTE — PROGRESS NOTES
Pt states that last time she had an ultrasound breech presentation   SVE 1, 70, -2   1040 Ultrasound by Dr. Luanne Meyer vertex position  [phone] page out through med dent     [phone] case reviewed with Dr. Treasure Zamora and new orders to start oxytocin

## 2019-07-29 NOTE — PROGRESS NOTES
Notified Dr. Luis Gilliland on [address]. House officer unable to rupture membranes at this time d/t being 1cm/thick/high. Ordered to stop pitocin. Let patient shower, eat, and then place a cervidil.

## 2019-07-29 NOTE — ANESTHESIA PRE PROCEDURE
given: Not Answered      Vital Signs (Current):   Vitals:    07/29/19 1345 07/29/19 1415 07/29/19 1445 07/29/19 1514   BP: (!) 122/58 (!) 107/55 (!) 107/56 (!) 105/57   Pulse: 88 74 83 75   Resp:       Temp:       TempSrc:       Weight:       Height:                                                  BP Readings from Last 3 Encounters:   07/29/19 (!) 105/57   07/25/19 113/78   07/25/19 113/77       NPO Status:                                                                                 BMI:   Wt Readings from Last 3 Encounters:   07/28/19 149 lb (67.6 kg)   07/25/19 150 lb (68 kg)   07/22/19 152 lb (68.9 kg)     Body mass index is 26.39 kg/m². CBC:   Lab Results   Component Value Date    WBC 9.2 07/28/2019    RBC 3.97 07/28/2019    HGB 9.4 07/28/2019    HCT 29.8 07/28/2019    MCV 75.1 07/28/2019    RDW 15.0 07/28/2019     07/28/2019       CMP:   Lab Results   Component Value Date     06/06/2019    K 4.7 06/06/2019    K 2.7 03/28/2019     06/06/2019    CO2 19 06/06/2019    BUN 4 06/06/2019    CREATININE 0.4 06/06/2019    GFRAA >60 06/06/2019    LABGLOM >60 06/06/2019    GLUCOSE 88 06/06/2019    GLUCOSE 69 03/09/2012    PROT 6.4 06/06/2019    CALCIUM 8.7 06/06/2019    BILITOT <0.2 06/06/2019    ALKPHOS 54 06/06/2019    AST 45 06/06/2019    ALT 12 06/06/2019       POC Tests: No results for input(s): POCGLU, POCNA, POCK, POCCL, POCBUN, POCHEMO, POCHCT in the last 72 hours.     Coags:   Lab Results   Component Value Date    PROTIME 11.2 07/28/2019    INR 1.0 07/28/2019    APTT 28.2 07/28/2019       HCG (If Applicable):   Lab Results   Component Value Date    PREGTESTUR pos 12/27/2018        ABGs: No results found for: PHART, PO2ART, AFF4WMM, FSI8WTK, BEART, H2STOOSL     Type & Screen (If Applicable):  No results found for: LABABO, [address]    Anesthesia Evaluation  Patient summary reviewed and Nursing notes reviewed no history of anesthetic complications:   Airway: Mallampati: II  TM distance: >3 FB

## 2019-07-30 PROCEDURE — 2500000003 HC RX 250 WO HCPCS: Performed by: ANESTHESIOLOGY

## 2019-07-30 PROCEDURE — 51701 INSERT BLADDER CATHETER: CPT

## 2019-07-30 PROCEDURE — 6370000000 HC RX 637 (ALT 250 FOR IP): Performed by: OBSTETRICS & GYNECOLOGY

## 2019-07-30 PROCEDURE — 2580000003 HC RX 258: Performed by: OBSTETRICS & GYNECOLOGY

## 2019-07-30 PROCEDURE — 6360000002 HC RX W HCPCS: Performed by: OBSTETRICS & GYNECOLOGY

## 2019-07-30 PROCEDURE — 1220000001 HC SEMI PRIVATE L&D R&B

## 2019-07-30 PROCEDURE — 6360000002 HC RX W HCPCS: Performed by: ANESTHESIOLOGY

## 2019-07-30 PROCEDURE — 3700000025 EPIDURAL BLOCK: Performed by: ANESTHESIOLOGY

## 2019-07-30 RX ORDER — ACETAMINOPHEN 650 MG
TABLET, EXTENDED RELEASE ORAL PRN
Status: DISCONTINUED | OUTPATIENT
Start: 2019-07-30 | End: 2019-07-31

## 2019-07-30 RX ORDER — NALBUPHINE HCL 10 MG/ML
5 AMPUL (ML) INJECTION EVERY 4 HOURS PRN
Status: DISCONTINUED | OUTPATIENT
Start: 2019-07-30 | End: 2019-07-31

## 2019-07-30 RX ORDER — NALOXONE HYDROCHLORIDE 0.4 MG/ML
0.4 INJECTION, SOLUTION INTRAMUSCULAR; INTRAVENOUS; SUBCUTANEOUS PRN
Status: DISCONTINUED | OUTPATIENT
Start: 2019-07-30 | End: 2019-07-31

## 2019-07-30 RX ORDER — ONDANSETRON 2 MG/ML
4 INJECTION INTRAMUSCULAR; INTRAVENOUS EVERY 6 HOURS PRN
Status: DISCONTINUED | OUTPATIENT
Start: 2019-07-30 | End: 2019-07-31

## 2019-07-30 RX ADMIN — SODIUM CHLORIDE, POTASSIUM CHLORIDE, SODIUM LACTATE AND CALCIUM CHLORIDE: 600; 310; 30; 20 INJECTION, SOLUTION INTRAVENOUS at 10:35

## 2019-07-30 RX ADMIN — ONDANSETRON 4 MG: 2 INJECTION INTRAMUSCULAR; INTRAVENOUS at 14:34

## 2019-07-30 RX ADMIN — Medication 15 ML: at 10:35

## 2019-07-30 RX ADMIN — Medication 15 ML/HR: at 19:27

## 2019-07-30 RX ADMIN — ONDANSETRON 4 MG: 2 INJECTION INTRAMUSCULAR; INTRAVENOUS at 22:11

## 2019-07-30 RX ADMIN — SODIUM CHLORIDE, POTASSIUM CHLORIDE, SODIUM LACTATE AND CALCIUM CHLORIDE: 600; 310; 30; 20 INJECTION, SOLUTION INTRAVENOUS at 09:27

## 2019-07-30 RX ADMIN — Medication 1 MILLI-UNITS/MIN: at 09:48

## 2019-07-30 RX ADMIN — Medication 15 ML/HR: at 10:37

## 2019-07-30 RX ADMIN — ONDANSETRON 4 MG: 2 INJECTION INTRAMUSCULAR; INTRAVENOUS at 06:22

## 2019-07-30 RX ADMIN — SODIUM CHLORIDE, POTASSIUM CHLORIDE, SODIUM LACTATE AND CALCIUM CHLORIDE: 600; 310; 30; 20 INJECTION, SOLUTION INTRAVENOUS at 15:44

## 2019-07-30 RX ADMIN — FAMOTIDINE 20 MG: 20 TABLET ORAL at 09:47

## 2019-07-30 RX ADMIN — SODIUM CHLORIDE, POTASSIUM CHLORIDE, SODIUM LACTATE AND CALCIUM CHLORIDE: 600; 310; 30; 20 INJECTION, SOLUTION INTRAVENOUS at 03:40

## 2019-07-30 RX ADMIN — BUTORPHANOL TARTRATE 2 MG: 2 INJECTION, SOLUTION INTRAMUSCULAR; INTRAVENOUS at 06:22

## 2019-07-30 RX ADMIN — SODIUM CHLORIDE, POTASSIUM CHLORIDE, SODIUM LACTATE AND CALCIUM CHLORIDE: 600; 310; 30; 20 INJECTION, SOLUTION INTRAVENOUS at 23:06

## 2019-07-30 ASSESSMENT — PAIN DESCRIPTION - DESCRIPTORS: DESCRIPTORS: ACHING;CRAMPING

## 2019-07-30 ASSESSMENT — PAIN SCALES - GENERAL: PAINLEVEL_OUTOF10: 6

## 2019-07-30 NOTE — PROGRESS NOTES
ASSUMED  PATIENT CARE AT 0730, G-4 P-1 , THROMBOHILIA LAST DOSE OF LOVENOX ON 7/28, HISTORY OF ANXIETY.  CERVIDIL REMOVED AT 0830 VE 1 CM G-4 P-1 , IUP AT 39W2D,

## 2019-07-30 NOTE — PROGRESS NOTES
Assumed care at [address] [place] [address], pt resting in bed with family at the bedside, comfortable with epidural, states some vaginal pressure, but states it is tolerable. Call light within reach.

## 2019-07-31 PROCEDURE — 6370000000 HC RX 637 (ALT 250 FOR IP): Performed by: OBSTETRICS & GYNECOLOGY

## 2019-07-31 PROCEDURE — 6360000002 HC RX W HCPCS: Performed by: OBSTETRICS & GYNECOLOGY

## 2019-07-31 PROCEDURE — 1220000000 HC SEMI PRIVATE OB R&B

## 2019-07-31 PROCEDURE — 7200000001 HC VAGINAL DELIVERY

## 2019-07-31 PROCEDURE — 88307 TISSUE EXAM BY PATHOLOGIST: CPT

## 2019-07-31 PROCEDURE — 3E0P7VZ INTRODUCTION OF HORMONE INTO FEMALE REPRODUCTIVE, VIA NATURAL OR ARTIFICIAL OPENING: ICD-10-PCS | Performed by: OBSTETRICS & GYNECOLOGY

## 2019-07-31 PROCEDURE — 6370000000 HC RX 637 (ALT 250 FOR IP)

## 2019-07-31 RX ORDER — IBUPROFEN 800 MG/1
800 TABLET ORAL EVERY 8 HOURS
Status: DISCONTINUED | OUTPATIENT
Start: 2019-08-01 | End: 2019-07-31

## 2019-07-31 RX ORDER — ONDANSETRON 4 MG/1
8 TABLET, FILM COATED ORAL EVERY 8 HOURS PRN
Status: DISCONTINUED | OUTPATIENT
Start: 2019-07-31 | End: 2019-08-01 | Stop reason: HOSPADM

## 2019-07-31 RX ORDER — BUSPIRONE HYDROCHLORIDE 5 MG/1
7.5 TABLET ORAL 2 TIMES DAILY
Status: DISCONTINUED | OUTPATIENT
Start: 2019-07-31 | End: 2019-08-01 | Stop reason: HOSPADM

## 2019-07-31 RX ORDER — BISACODYL 10 MG
10 SUPPOSITORY, RECTAL RECTAL DAILY PRN
Status: DISCONTINUED | OUTPATIENT
Start: 2019-07-31 | End: 2019-08-01 | Stop reason: HOSPADM

## 2019-07-31 RX ORDER — ACETAMINOPHEN 650 MG
TABLET, EXTENDED RELEASE ORAL PRN
Status: DISCONTINUED | OUTPATIENT
Start: 2019-07-31 | End: 2019-08-01 | Stop reason: HOSPADM

## 2019-07-31 RX ORDER — LANOLIN 100 %
OINTMENT (GRAM) TOPICAL PRN
Status: DISCONTINUED | OUTPATIENT
Start: 2019-07-31 | End: 2019-08-01 | Stop reason: HOSPADM

## 2019-07-31 RX ORDER — DOCUSATE SODIUM 100 MG/1
100 CAPSULE, LIQUID FILLED ORAL 2 TIMES DAILY
Status: DISCONTINUED | OUTPATIENT
Start: 2019-07-31 | End: 2019-08-01 | Stop reason: HOSPADM

## 2019-07-31 RX ORDER — FERROUS SULFATE 325(65) MG
325 TABLET ORAL 2 TIMES DAILY WITH MEALS
Status: DISCONTINUED | OUTPATIENT
Start: 2019-07-31 | End: 2019-08-01 | Stop reason: HOSPADM

## 2019-07-31 RX ORDER — IBUPROFEN 800 MG/1
800 TABLET ORAL EVERY 8 HOURS
Status: DISCONTINUED | OUTPATIENT
Start: 2019-07-31 | End: 2019-08-01 | Stop reason: HOSPADM

## 2019-07-31 RX ORDER — ACETAMINOPHEN 325 MG/1
650 TABLET ORAL EVERY 4 HOURS PRN
Status: DISCONTINUED | OUTPATIENT
Start: 2019-07-31 | End: 2019-08-01 | Stop reason: HOSPADM

## 2019-07-31 RX ORDER — SIMETHICONE 80 MG
80 TABLET,CHEWABLE ORAL EVERY 6 HOURS PRN
Status: DISCONTINUED | OUTPATIENT
Start: 2019-07-31 | End: 2019-08-01 | Stop reason: HOSPADM

## 2019-07-31 RX ORDER — IBUPROFEN 800 MG/1
TABLET ORAL
Status: COMPLETED
Start: 2019-07-31 | End: 2019-07-31

## 2019-07-31 RX ORDER — HYDROCODONE BITARTRATE AND ACETAMINOPHEN 5; 325 MG/1; MG/1
1 TABLET ORAL EVERY 4 HOURS PRN
Status: DISCONTINUED | OUTPATIENT
Start: 2019-07-31 | End: 2019-08-01 | Stop reason: HOSPADM

## 2019-07-31 RX ADMIN — IBUPROFEN 800 MG: 800 TABLET, FILM COATED ORAL at 03:44

## 2019-07-31 RX ADMIN — IBUPROFEN 800 MG: 800 TABLET, FILM COATED ORAL at 12:45

## 2019-07-31 RX ADMIN — ENOXAPARIN SODIUM 40 MG: 40 INJECTION SUBCUTANEOUS at 20:48

## 2019-07-31 RX ADMIN — BUSPIRONE HYDROCHLORIDE 7.5 MG: 5 TABLET ORAL at 09:06

## 2019-07-31 RX ADMIN — BENZOCAINE AND LEVOMENTHOL: 200; 5 SPRAY TOPICAL at 09:07

## 2019-07-31 RX ADMIN — Medication: at 01:40

## 2019-07-31 RX ADMIN — FERROUS SULFATE TAB 325 MG (65 MG ELEMENTAL FE) 325 MG: 325 (65 FE) TAB at 19:36

## 2019-07-31 RX ADMIN — BUSPIRONE HYDROCHLORIDE 7.5 MG: 5 TABLET ORAL at 21:46

## 2019-07-31 RX ADMIN — DOCUSATE SODIUM 100 MG: 100 CAPSULE, LIQUID FILLED ORAL at 20:48

## 2019-07-31 RX ADMIN — FERROUS SULFATE TAB 325 MG (65 MG ELEMENTAL FE) 325 MG: 325 (65 FE) TAB at 09:06

## 2019-07-31 RX ADMIN — IBUPROFEN 800 MG: 800 TABLET, FILM COATED ORAL at 20:48

## 2019-07-31 RX ADMIN — DOCUSATE SODIUM 100 MG: 100 CAPSULE, LIQUID FILLED ORAL at 09:06

## 2019-07-31 ASSESSMENT — PAIN SCALES - GENERAL
PAINLEVEL_OUTOF10: 5
PAINLEVEL_OUTOF10: 3
PAINLEVEL_OUTOF10: 4

## 2019-07-31 ASSESSMENT — PAIN DESCRIPTION - RADICULAR PAIN: RADICULAR_PAIN: ABSENT

## 2019-07-31 NOTE — PROGRESS NOTES
Dr Lakhwinder Sorenson called in for updated, informed of sve, pitocin rate, fhr tracing/ctx pattern. Orders received that pitocin can be increased to a max rate at 24mU/min.

## 2019-08-01 VITALS
DIASTOLIC BLOOD PRESSURE: 65 MMHG | HEIGHT: 63 IN | HEART RATE: 91 BPM | RESPIRATION RATE: 16 BRPM | WEIGHT: 149 LBS | OXYGEN SATURATION: 100 % | TEMPERATURE: 97.9 F | BODY MASS INDEX: 26.4 KG/M2 | SYSTOLIC BLOOD PRESSURE: 115 MMHG

## 2019-08-01 LAB
HCT VFR BLD CALC: 26.7 % (ref 34–48)
HEMOGLOBIN: 8.1 G/DL (ref 11.5–15.5)

## 2019-08-01 PROCEDURE — 36415 COLL VENOUS BLD VENIPUNCTURE: CPT

## 2019-08-01 PROCEDURE — 6370000000 HC RX 637 (ALT 250 FOR IP): Performed by: OBSTETRICS & GYNECOLOGY

## 2019-08-01 PROCEDURE — 85018 HEMOGLOBIN: CPT

## 2019-08-01 PROCEDURE — 6360000002 HC RX W HCPCS: Performed by: OBSTETRICS & GYNECOLOGY

## 2019-08-01 PROCEDURE — 90471 IMMUNIZATION ADMIN: CPT | Performed by: OBSTETRICS & GYNECOLOGY

## 2019-08-01 PROCEDURE — 90715 TDAP VACCINE 7 YRS/> IM: CPT | Performed by: OBSTETRICS & GYNECOLOGY

## 2019-08-01 PROCEDURE — 85014 HEMATOCRIT: CPT

## 2019-08-01 RX ADMIN — FERROUS SULFATE TAB 325 MG (65 MG ELEMENTAL FE) 325 MG: 325 (65 FE) TAB at 09:45

## 2019-08-01 RX ADMIN — DOCUSATE SODIUM 100 MG: 100 CAPSULE, LIQUID FILLED ORAL at 09:45

## 2019-08-01 RX ADMIN — BUSPIRONE HYDROCHLORIDE 7.5 MG: 5 TABLET ORAL at 09:46

## 2019-08-01 RX ADMIN — TETANUS TOXOID, REDUCED DIPHTHERIA TOXOID AND ACELLULAR PERTUSSIS VACCINE, ADSORBED 0.5 ML: 5; 2.5; 8; 8; 2.5 SUSPENSION INTRAMUSCULAR at 09:47

## 2019-08-01 RX ADMIN — IBUPROFEN 800 MG: 800 TABLET, FILM COATED ORAL at 05:07

## 2019-08-01 ASSESSMENT — PAIN SCALES - GENERAL: PAINLEVEL_OUTOF10: 3

## 2019-08-01 ASSESSMENT — PAIN DESCRIPTION - RADICULAR PAIN: RADICULAR_PAIN: ABSENT

## 2019-08-01 ASSESSMENT — PAIN DESCRIPTION - PROGRESSION: CLINICAL_PROGRESSION: NOT CHANGED

## 2019-08-01 NOTE — PROGRESS NOTES
Progress Note    SUBJECTIVE: patient status post vaginal delivery day 1 doing well ,no complaints normal postpartum course    OBJECTIVE:    VITALS:  /65   Pulse 91   Temp 97.9 °F (36.6 °C) (Oral)   Resp 16   Ht 5' 3\" (1.6 m)   Wt 149 lb (67.6 kg)   LMP 10/28/2018 (Approximate)   SpO2 100%   Breastfeeding?  Unknown   BMI 26.39 kg/m²   Physical Exam  lung:cta  Heart : regular rythm  Abdomen:soft non tender ,firm uterus ,bs present  Extremities :normal no evidence of DVT  DATA:  CBC:   Lab Results   Component Value Date    WBC 9.2 07/28/2019    RBC 3.97 07/28/2019    HGB 8.1 08/01/2019    HCT 26.7 08/01/2019    MCV 75.1 07/28/2019    MCH 23.7 07/28/2019    MCHC 31.5 07/28/2019    RDW 15.0 07/28/2019     07/28/2019    MPV 10.3 07/28/2019       ASSESSMENT AND PLAN:  Patient Active Problem List   Diagnosis    Thrombophilia complicating pregnancy, antepartum ([facility] [address]    MTHFR mutation ([facility])    Cigarette smoker one half pack a day or less    Bipolar affective disorder ([facility] [address]    Long term current use of anticoagulant therapy    Tobacco use disorder affecting pregnancy, antepartum    Severe anxiety with panic    Suspected damage to fetus from maternal drug use    Heartburn during pregnancy in third trimester    36 weeks gestation of pregnancy    Headache in pregnancy, antepartum, third trimester    Cramping affecting pregnancy, antepartum    Multiparity    Normal pregnancy, unspecified trimester       Normal post partum care   Anticipate discharge home

## 2019-08-12 ENCOUNTER — OFFICE VISIT (OUTPATIENT)
Dept: FAMILY MEDICINE CLINIC | Age: 24
End: 2019-08-12
Payer: COMMERCIAL

## 2019-08-12 VITALS
RESPIRATION RATE: 18 BRPM | HEIGHT: 64 IN | BODY MASS INDEX: 22.67 KG/M2 | SYSTOLIC BLOOD PRESSURE: 118 MMHG | HEART RATE: 120 BPM | DIASTOLIC BLOOD PRESSURE: 76 MMHG | WEIGHT: 132.8 LBS | OXYGEN SATURATION: 98 % | TEMPERATURE: 98.4 F

## 2019-08-12 DIAGNOSIS — J30.2 SEASONAL ALLERGIC RHINITIS, UNSPECIFIED TRIGGER: Primary | ICD-10-CM

## 2019-08-12 DIAGNOSIS — K64.9 HEMORRHOIDS, UNSPECIFIED HEMORRHOID TYPE: ICD-10-CM

## 2019-08-12 PROCEDURE — 1036F TOBACCO NON-USER: CPT | Performed by: FAMILY MEDICINE

## 2019-08-12 PROCEDURE — 99214 OFFICE O/P EST MOD 30 MIN: CPT | Performed by: FAMILY MEDICINE

## 2019-08-12 PROCEDURE — G8427 DOCREV CUR MEDS BY ELIG CLIN: HCPCS | Performed by: FAMILY MEDICINE

## 2019-08-12 PROCEDURE — 1111F DSCHRG MED/CURRENT MED MERGE: CPT | Performed by: FAMILY MEDICINE

## 2019-08-12 PROCEDURE — G8420 CALC BMI NORM PARAMETERS: HCPCS | Performed by: FAMILY MEDICINE

## 2019-08-12 RX ORDER — FLUTICASONE PROPIONATE 50 MCG
1 SPRAY, SUSPENSION (ML) NASAL DAILY
Qty: 1 BOTTLE | Refills: 3 | Status: SHIPPED
Start: 2019-08-12 | End: 2021-09-14 | Stop reason: ALTCHOICE

## 2019-08-12 RX ORDER — DOCUSATE SODIUM 100 MG/1
100 CAPSULE, LIQUID FILLED ORAL 2 TIMES DAILY
Qty: 60 CAPSULE | Refills: 1 | Status: SHIPPED | OUTPATIENT
Start: 2019-08-12 | End: 2019-10-31 | Stop reason: ALTCHOICE

## 2019-08-12 ASSESSMENT — ENCOUNTER SYMPTOMS
ABDOMINAL PAIN: 0
RHINORRHEA: 1
SINUS PRESSURE: 0
CONSTIPATION: 0
BACK PAIN: 0
RECTAL PAIN: 1
SHORTNESS OF BREATH: 0
NAUSEA: 0
WHEEZING: 0
VOMITING: 0
DIARRHEA: 0
COUGH: 1
SORE THROAT: 1

## 2019-08-12 NOTE — PATIENT INSTRUCTIONS
and have to limit fluids, talk with your doctor before you increase the amount of fluids you drink. · Use a stool softener that contains bran or psyllium. You can save money by buying bran or psyllium (available in bulk at most health food stores) and sprinkling it on foods or stirring it into fruit juice. Or you can use a product such as Metamucil or Hydrocil. · Practice healthy bowel habits. ? Go to the bathroom as soon as you have the urge. ? Avoid straining to pass stools. Relax and give yourself time to let things happen naturally. ? Do not hold your breath while passing stools. ? Do not read while sitting on the toilet. Get off the toilet as soon as you have finished. · Take your medicines exactly as prescribed. Call your doctor if you think you are having a problem with your medicine. When should you call for help? Call 911 anytime you think you may need emergency care. For example, call if:    · You pass maroon or very bloody stools.    Call your doctor now or seek immediate medical care if:    · You have increased pain.     · You have increased bleeding.    Watch closely for changes in your health, and be sure to contact your doctor if:    · Your symptoms have not improved after 3 or 4 days. Where can you learn more? Go to [email or web address] [email or web address].mana.[email or web address] org and sign in to your [employer] account. Enter U278 in the [facility] box to learn more about \"Hemorrhoids: Care Instructions. \"     If you do not have an account, please click on the \"Sign Up Now\" link. Current as of: November 7, 2018  Content Version: 12.1  © 5592-7727 Healthwise, Incorporated. Care instructions adapted under license by [facility] ([facility]). If you have questions about a medical condition or this instruction, always ask your healthcare professional. [address] any warranty or liability for your use of this information.

## 2020-06-10 ENCOUNTER — HOSPITAL ENCOUNTER (OUTPATIENT)
Age: 25
Discharge: HOME | End: 2020-06-12
Payer: COMMERCIAL

## 2020-06-10 ENCOUNTER — OFFICE VISIT (OUTPATIENT)
Dept: FAMILY MEDICINE CLINIC | Age: 25
End: 2020-06-10
Payer: COMMERCIAL

## 2020-06-10 VITALS
BODY MASS INDEX: 27.6 KG/M2 | SYSTOLIC BLOOD PRESSURE: 107 MMHG | OXYGEN SATURATION: 98 % | WEIGHT: 150 LBS | DIASTOLIC BLOOD PRESSURE: 73 MMHG | RESPIRATION RATE: 16 BRPM | HEART RATE: 96 BPM | TEMPERATURE: 97.6 F | HEIGHT: 62 IN

## 2020-06-10 PROBLEM — O26.893 HEARTBURN DURING PREGNANCY IN THIRD TRIMESTER: Status: RESOLVED | Noted: 2019-06-06 | Resolved: 2020-06-10

## 2020-06-10 PROBLEM — R12 HEARTBURN DURING PREGNANCY IN THIRD TRIMESTER: Status: RESOLVED | Noted: 2019-06-06 | Resolved: 2020-06-10

## 2020-06-10 PROBLEM — Z34.90 NORMAL PREGNANCY, UNSPECIFIED TRIMESTER: Status: RESOLVED | Noted: 2019-07-28 | Resolved: 2020-06-10

## 2020-06-10 PROBLEM — Z3A.36 36 WEEKS GESTATION OF PREGNANCY: Status: RESOLVED | Noted: 2019-07-09 | Resolved: 2020-06-10

## 2020-06-10 PROBLEM — O35.5XX0 SUSPECTED DAMAGE TO FETUS FROM MATERNAL DRUG USE: Status: RESOLVED | Noted: 2019-01-24 | Resolved: 2020-06-10

## 2020-06-10 LAB
BASOPHILS ABSOLUTE: 0.04 E9/L (ref 0–0.2)
BASOPHILS RELATIVE PERCENT: 0.6 % (ref 0–2)
EOSINOPHILS ABSOLUTE: 0.12 E9/L (ref 0.05–0.5)
EOSINOPHILS RELATIVE PERCENT: 1.8 % (ref 0–6)
HCT VFR BLD CALC: 37.1 % (ref 34–48)
HEMOGLOBIN: 11.4 G/DL (ref 11.5–15.5)
IMMATURE GRANULOCYTES #: 0.01 E9/L
IMMATURE GRANULOCYTES %: 0.2 % (ref 0–5)
LYMPHOCYTES ABSOLUTE: 2.95 E9/L (ref 1.5–4)
LYMPHOCYTES RELATIVE PERCENT: 44.7 % (ref 20–42)
MCH RBC QN AUTO: 23.2 PG (ref 26–35)
MCHC RBC AUTO-ENTMCNC: 30.7 % (ref 32–34.5)
MCV RBC AUTO: 75.6 FL (ref 80–99.9)
MONOCYTES ABSOLUTE: 0.22 E9/L (ref 0.1–0.95)
MONOCYTES RELATIVE PERCENT: 3.3 % (ref 2–12)
NEUTROPHILS ABSOLUTE: 3.26 E9/L (ref 1.8–7.3)
NEUTROPHILS RELATIVE PERCENT: 49.4 % (ref 43–80)
PDW BLD-RTO: 15.3 FL (ref 11.5–15)
PLATELET # BLD: 259 E9/L (ref 130–450)
PMV BLD AUTO: 10.4 FL (ref 7–12)
RBC # BLD: 4.91 E12/L (ref 3.5–5.5)
WBC # BLD: 6.6 E9/L (ref 4.5–11.5)

## 2020-06-10 PROCEDURE — 80053 COMPREHEN METABOLIC PANEL: CPT

## 2020-06-10 PROCEDURE — 85025 COMPLETE CBC W/AUTO DIFF WBC: CPT

## 2020-06-10 PROCEDURE — G8419 CALC BMI OUT NRM PARAM NOF/U: HCPCS | Performed by: FAMILY MEDICINE

## 2020-06-10 PROCEDURE — 84439 ASSAY OF FREE THYROXINE: CPT

## 2020-06-10 PROCEDURE — G8427 DOCREV CUR MEDS BY ELIG CLIN: HCPCS | Performed by: FAMILY MEDICINE

## 2020-06-10 PROCEDURE — 84443 ASSAY THYROID STIM HORMONE: CPT

## 2020-06-10 PROCEDURE — 99214 OFFICE O/P EST MOD 30 MIN: CPT | Performed by: FAMILY MEDICINE

## 2020-06-10 PROCEDURE — 1036F TOBACCO NON-USER: CPT | Performed by: FAMILY MEDICINE

## 2020-06-10 RX ORDER — BUSPIRONE HYDROCHLORIDE 7.5 MG/1
7.5 TABLET ORAL 2 TIMES DAILY
Qty: 60 TABLET | Refills: 3 | Status: SHIPPED
Start: 2020-06-10 | End: 2021-09-14 | Stop reason: ALTCHOICE

## 2020-06-10 NOTE — PATIENT INSTRUCTIONS
throat. Call your doctor at once if you have:  · chest pain;  · shortness of breath; or  · a light-headed feeling, like you might pass out. Common side effects may include:  · headache;  · dizziness, drowsiness;  · sleep problems (insomnia);  · nausea, upset stomach; or  · feeling nervous or excited. This is not a complete list of side effects and others may occur. Call your doctor for medical advice about side effects. You may report side effects to FDA at 7-846-FDA-0516. What other drugs will affect buspirone? Taking this medicine with other drugs that make you sleepy or slow your breathing can worsen these effects. Ask your doctor before taking buspirone with a sleeping pill, narcotic pain medicine, muscle relaxer, or medicine for anxiety, depression, or seizures. Other drugs may interact with buspirone, including prescription and over-the-counter medicines, vitamins, and herbal products. Tell each of your health care providers about all medicines you use now and any medicine you start or stop using. Where can I get more information? Your pharmacist can provide more information about buspirone. Remember, keep this and all other medicines out of the reach of children, never share your medicines with others, and use this medication only for the indication prescribed. Every effort has been made to ensure that the information provided by [address] is accurate, up-to-date, and complete, but no guarantee is made to that effect. Drug information contained herein may be time sensitive. [facility] information has been compiled for use by healthcare practitioners and consumers in the [place] and therefore [facility] does not warrant that uses outside of the [place] are appropriate, unless specifically indicated otherwise. [facility]'s drug information does not endorse drugs, diagnose patients or recommend therapy.  [employer] drug information is an informational resource designed to assist

## 2020-06-11 LAB
ALBUMIN SERPL-MCNC: 4.7 G/DL (ref 3.5–5.2)
ALP BLD-CCNC: 73 U/L (ref 35–104)
ALT SERPL-CCNC: 15 U/L (ref 0–32)
ANION GAP SERPL CALCULATED.3IONS-SCNC: 16 MMOL/L (ref 7–16)
AST SERPL-CCNC: 16 U/L (ref 0–31)
BILIRUB SERPL-MCNC: <0.2 MG/DL (ref 0–1.2)
BUN BLDV-MCNC: 9 MG/DL (ref 6–20)
CALCIUM SERPL-MCNC: 9.4 MG/DL (ref 8.6–10.2)
CHLORIDE BLD-SCNC: 99 MMOL/L (ref 98–107)
CO2: 24 MMOL/L (ref 22–29)
CREAT SERPL-MCNC: 0.6 MG/DL (ref 0.5–1)
GFR AFRICAN AMERICAN: >60
GFR NON-AFRICAN AMERICAN: >60 ML/MIN/1.73
GLUCOSE BLD-MCNC: 101 MG/DL (ref 74–99)
POTASSIUM SERPL-SCNC: 4.2 MMOL/L (ref 3.5–5)
SODIUM BLD-SCNC: 139 MMOL/L (ref 132–146)
T4 FREE: 1.19 NG/DL (ref 0.93–1.7)
TOTAL PROTEIN: 7.5 G/DL (ref 6.4–8.3)
TSH SERPL DL<=0.05 MIU/L-ACNC: 0.69 UIU/ML (ref 0.27–4.2)

## 2021-11-01 ENCOUNTER — APPOINTMENT (OUTPATIENT)
Dept: ULTRASOUND IMAGING | Age: 26
End: 2021-11-01
Payer: COMMERCIAL

## 2021-11-01 ENCOUNTER — HOSPITAL ENCOUNTER (EMERGENCY)
Age: 26
Discharge: HOME | End: 2021-11-01
Attending: EMERGENCY MEDICINE
Payer: COMMERCIAL

## 2021-11-01 VITALS
RESPIRATION RATE: 16 BRPM | SYSTOLIC BLOOD PRESSURE: 134 MMHG | TEMPERATURE: 97.6 F | OXYGEN SATURATION: 100 % | HEART RATE: 98 BPM | DIASTOLIC BLOOD PRESSURE: 60 MMHG

## 2021-11-01 DIAGNOSIS — O46.90 VAGINAL BLEEDING IN PREGNANCY: Primary | ICD-10-CM

## 2021-11-01 DIAGNOSIS — O20.0 THREATENED MISCARRIAGE: ICD-10-CM

## 2021-11-01 LAB
ABO/RH: NORMAL
ALBUMIN SERPL-MCNC: 4.6 G/DL (ref 3.5–5.2)
ALP BLD-CCNC: 74 U/L (ref 35–104)
ALT SERPL-CCNC: 12 U/L (ref 0–32)
ANION GAP SERPL CALCULATED.3IONS-SCNC: 14 MMOL/L (ref 7–16)
AST SERPL-CCNC: 17 U/L (ref 0–31)
BASOPHILS ABSOLUTE: 0.02 E9/L (ref 0–0.2)
BASOPHILS RELATIVE PERCENT: 0.2 % (ref 0–2)
BILIRUB SERPL-MCNC: <0.2 MG/DL (ref 0–1.2)
BILIRUBIN URINE: NEGATIVE
BLOOD, URINE: NEGATIVE
BUN BLDV-MCNC: 5 MG/DL (ref 6–20)
CALCIUM SERPL-MCNC: 9.5 MG/DL (ref 8.6–10.2)
CHLORIDE BLD-SCNC: 100 MMOL/L (ref 98–107)
CLARITY: CLEAR
CO2: 22 MMOL/L (ref 22–29)
COLOR: YELLOW
CREAT SERPL-MCNC: 0.5 MG/DL (ref 0.5–1)
EOSINOPHILS ABSOLUTE: 0.11 E9/L (ref 0.05–0.5)
EOSINOPHILS RELATIVE PERCENT: 1.2 % (ref 0–6)
GFR AFRICAN AMERICAN: >60
GFR NON-AFRICAN AMERICAN: >60 ML/MIN/1.73
GLUCOSE BLD-MCNC: 108 MG/DL (ref 74–99)
GLUCOSE URINE: 500 MG/DL
GONADOTROPIN, CHORIONIC (HCG) QUANT: ABNORMAL MIU/ML
HCT VFR BLD CALC: 36.4 % (ref 34–48)
HEMOGLOBIN: 11.7 G/DL (ref 11.5–15.5)
IMMATURE GRANULOCYTES #: 0.01 E9/L
IMMATURE GRANULOCYTES %: 0.1 % (ref 0–5)
KETONES, URINE: ABNORMAL MG/DL
LEUKOCYTE ESTERASE, URINE: NEGATIVE
LYMPHOCYTES ABSOLUTE: 4.36 E9/L (ref 1.5–4)
LYMPHOCYTES RELATIVE PERCENT: 47.6 % (ref 20–42)
MAGNESIUM: 2.2 MG/DL (ref 1.6–2.6)
MCH RBC QN AUTO: 23.3 PG (ref 26–35)
MCHC RBC AUTO-ENTMCNC: 32.1 % (ref 32–34.5)
MCV RBC AUTO: 72.5 FL (ref 80–99.9)
MONOCYTES ABSOLUTE: 0.28 E9/L (ref 0.1–0.95)
MONOCYTES RELATIVE PERCENT: 3.1 % (ref 2–12)
NEUTROPHILS ABSOLUTE: 4.38 E9/L (ref 1.8–7.3)
NEUTROPHILS RELATIVE PERCENT: 47.8 % (ref 43–80)
NITRITE, URINE: NEGATIVE
PDW BLD-RTO: 16 FL (ref 11.5–15)
PH UA: 6.5 (ref 5–9)
PLATELET # BLD: 269 E9/L (ref 130–450)
PMV BLD AUTO: 9 FL (ref 7–12)
POTASSIUM REFLEX MAGNESIUM: 3.1 MMOL/L (ref 3.5–5)
PROTEIN UA: NEGATIVE MG/DL
RBC # BLD: 5.02 E12/L (ref 3.5–5.5)
SODIUM BLD-SCNC: 136 MMOL/L (ref 132–146)
SPECIFIC GRAVITY UA: 1.01 (ref 1–1.03)
TOTAL PROTEIN: 7.9 G/DL (ref 6.4–8.3)
UROBILINOGEN, URINE: 0.2 E.U./DL
WBC # BLD: 9.2 E9/L (ref 4.5–11.5)

## 2021-11-01 PROCEDURE — 81003 URINALYSIS AUTO W/O SCOPE: CPT

## 2021-11-01 PROCEDURE — 84702 CHORIONIC GONADOTROPIN TEST: CPT

## 2021-11-01 PROCEDURE — 6370000000 HC RX 637 (ALT 250 FOR IP): Performed by: EMERGENCY MEDICINE

## 2021-11-01 PROCEDURE — 85025 COMPLETE CBC W/AUTO DIFF WBC: CPT

## 2021-11-01 PROCEDURE — 80053 COMPREHEN METABOLIC PANEL: CPT

## 2021-11-01 PROCEDURE — 76817 TRANSVAGINAL US OBSTETRIC: CPT

## 2021-11-01 PROCEDURE — 86900 BLOOD TYPING SEROLOGIC ABO: CPT

## 2021-11-01 PROCEDURE — 86901 BLOOD TYPING SEROLOGIC RH(D): CPT

## 2021-11-01 PROCEDURE — 99282 EMERGENCY DEPT VISIT SF MDM: CPT

## 2021-11-01 PROCEDURE — 83735 ASSAY OF MAGNESIUM: CPT

## 2021-11-01 PROCEDURE — 87088 URINE BACTERIA CULTURE: CPT

## 2021-11-01 RX ORDER — POTASSIUM CHLORIDE 20 MEQ/1
40 TABLET, EXTENDED RELEASE ORAL ONCE
Status: COMPLETED | OUTPATIENT
Start: 2021-11-01 | End: 2021-11-01

## 2021-11-01 RX ADMIN — POTASSIUM CHLORIDE 40 MEQ: 1500 TABLET, EXTENDED RELEASE ORAL at 23:29

## 2021-11-01 ASSESSMENT — PAIN SCALES - GENERAL: PAINLEVEL_OUTOF10: 3

## 2021-11-01 NOTE — ED NOTES
FIRST PROVIDER CONTACT ASSESSMENT NOTE       Department of Emergency Medicine   11/1/21  6:23 PM EDT    Chief Complaint: Vaginal Bleeding (with clots, about 13 weeks pregnant, sent in by Marcos Gonzalez.)    History of Present Illness:   Chanelle Velarde is a 31 y.o. female who presents to the ED for bright red vaginal bleeding. Patient is approximately 13 weeks pregnant. OBGYN: Dr. Marcos Gonzalez     Focused Physical Exam:  VS:  /60   Pulse 98   Temp 97.6 °F (36.4 °C) (Tympanic)   Resp 16   LMP 08/05/2021   SpO2 100%      General: Alert and in no apparent distress     Medical History:  has a past medical history of Asthma, Bipolar 1 disorder ([facility] [address] Concussion, Depression, Neck strain, Panic attack, Pre-eclampsia, and Thrombophilia complicating pregnancy, antepartum ([facility] [address] Surgical History:  has a past surgical history that includes Tonsillectomy and [place] tooth extraction. Social History:  reports that she quit smoking about 3 years ago. Her smoking use included cigarettes. She has a 3.00 pack-year smoking history. She has never used smokeless tobacco. She reports previous alcohol use. She reports that she does not use drugs. Family History: family history includes Depression in her brother and mother; Diabetes in her maternal grandmother, paternal grandmother, and son.     *ALLERGIES*     Zyrtec [cetirizine]     Initial Plan of Care:  Initiate Treatment-Testing, Proceed toTreatment Area When Bed Available for ED Attending/MLP to Continue Care    -----------------END OF FIRST PROVIDER CONTACT ASSESSMENT NOTE--------------  Electronically signed by Enrique Smith PA-C   DD: 11/1/21         Enrique Smith PA-C  11/01/21 Heron Davis PA-C  11/01/21 [phone]

## 2021-11-02 NOTE — ED PROVIDER NOTES
ED Attending shared visit  CC: No     Department of Emergency Medicine   ED  Provider Note  Admit Date/RoomTime: 11/1/2021 11:10 PM  ED Room: 18/18     Chief Complaint   Vaginal Bleeding (with clots, about 13 weeks pregnant, sent in by Doron.)    History of Present Illness      Mirta Holloway is a 31 y.o. old female who presents to the emergency department for vaginal bleeding that began today. Patient states she was using the restroom when she noticed a clot when she was wiping. Patient states the blood was bright red. She is approximately 13 weeks pregnant. Patient follows with Dr. Sal. She denies any abdominal pain or cramping. She has no fever/chills, urinary complaints, chest pain, shortness of breath, nausea, vomiting, diarrhea, or recent trauma/injury. She is alert and oriented x3 and in no apparent distress at this exam.  Patient is nontoxic-appearing. OBGYN: Dr. Sal     ROS   Pertinent positives and negatives are stated within HPI, all other systems reviewed and are negative. Past Medical History:  has a past medical history of Asthma, Bipolar 1 disorder ([address] Concussion, Depression, Neck strain, Panic attack, Pre-eclampsia, and Thrombophilia complicating pregnancy, antepartum ([facility] [address] Past Surgical History:  has a past surgical history that includes Tonsillectomy and [place] tooth extraction. Social History:  reports that she quit smoking about 3 years ago. Her smoking use included cigarettes. She has a 3.00 pack-year smoking history. She has never used smokeless tobacco. She reports previous alcohol use. She reports that she does not use drugs. Family History: family history includes Depression in her brother and mother; Diabetes in her maternal grandmother, paternal grandmother, and son. The patients home medications have been reviewed. Allergies: Zyrtec [cetirizine]  Allergies have been reviewed with patient.      Physical Exam   VS:  /60   Pulse 98   Temp 97.6 °F (36.4 °C) (Tympanic)   Resp 16   LMP 08/05/2021   SpO2 100%      Oxygen Saturation Interpretation: Normal.    Constitutional:  Alert and oriented x4, development consistent with age, NAD  HEENT:  NC/NT. EOMI,  Airway patent. Neck:  Supple. No lymphadenopathy. No meningeal signs   Respiratory:  Clear to auscultation and breath sounds equal.  CV:  Regular rate and rhythm. GI:  Soft, non-tender, non-distended  Extremities: No tenderness or edema bilaterally   Integument:  Normal turgor. Warm, dry, without visible rash, unless noted elsewhere. Neurological:  Orientation age-appropriate. Motor functions intact.     Lab / Imaging Results   (All laboratory and radiology results have been personally reviewed by myself)  Labs:  Results for orders placed or performed during the hospital encounter of 11/01/21   CBC Auto Differential   Result Value Ref Range    WBC 9.2 4.5 - 11.5 E9/L    RBC 5.02 3.50 - 5.50 E12/L    Hemoglobin 11.7 11.5 - 15.5 g/dL    Hematocrit 36.4 34.0 - 48.0 %    MCV 72.5 (L) 80.0 - 99.9 fL    MCH 23.3 (L) 26.0 - 35.0 pg    MCHC 32.1 32.0 - 34.5 %    RDW 16.0 (H) 11.5 - 15.0 fL    Platelets 154 566 - 929 E9/L    MPV 9.0 7.0 - 12.0 fL    Neutrophils % 47.8 43.0 - 80.0 %    Immature Granulocytes % 0.1 0.0 - 5.0 %    Lymphocytes % 47.6 (H) 20.0 - 42.0 %    Monocytes % 3.1 2.0 - 12.0 %    Eosinophils % 1.2 0.0 - 6.0 %    Basophils % 0.2 0.0 - 2.0 %    Neutrophils Absolute 4.38 1.80 - 7.30 E9/L    Immature Granulocytes # 0.01 E9/L    Lymphocytes Absolute 4.36 (H) 1.50 - 4.00 E9/L    Monocytes Absolute 0.28 0.10 - 0.95 E9/L    Eosinophils Absolute 0.11 0.05 - 0.50 E9/L    Basophils Absolute 0.02 0.00 - 0.20 E9/L   Comprehensive Metabolic Panel w/ Reflex to MG   Result Value Ref Range    Sodium 136 132 - 146 mmol/L    Potassium reflex Magnesium 3.1 (L) 3.5 - 5.0 mmol/L    Chloride 100 98 - 107 mmol/L    CO2 22 22 - 29 mmol/L    Anion Gap 14 7 - 16 mmol/L    Glucose 108 (H) 74 - 99 mg/dL BUN 5 (L) 6 - 20 mg/dL    CREATININE 0.5 0.5 - 1.0 mg/dL    GFR Non-African American >60 >=60 mL/min/1.73    GFR African American >60     Calcium 9.5 8.6 - 10.2 mg/dL    Total Protein 7.9 6.4 - 8.3 g/dL    Albumin 4.6 3.5 - 5.2 g/dL    Total Bilirubin <0.2 0.0 - 1.2 mg/dL    Alkaline Phosphatase 74 35 - 104 U/L    ALT 12 0 - 32 U/L    AST 17 0 - 31 U/L   hCG, quantitative, pregnancy   Result Value Ref Range    hCG Quant 75957.0 (H) <10 mIU/mL   Urinalysis   Result Value Ref Range    Color, UA Yellow Straw/Yellow    Clarity, UA Clear Clear    Glucose, Ur 500 (A) Negative mg/dL    Bilirubin Urine Negative Negative    Ketones, Urine TRACE (A) Negative mg/dL    Specific Gravity, UA 1.010 1.005 - 1.030    Blood, Urine Negative Negative    pH, UA 6.5 5.0 - 9.0    Protein, UA Negative Negative mg/dL    Urobilinogen, Urine 0.2 <2.0 E.U./dL    Nitrite, Urine Negative Negative    Leukocyte Esterase, Urine Negative Negative   Magnesium   Result Value Ref Range    Magnesium 2.2 1.6 - 2.6 mg/dL   ABO/RH   Result Value Ref Range    ABO/Rh O POS      Imaging: All Radiology results interpreted by Radiologist unless otherwise noted. US OB TRANSVAGINAL   Final Result   1. Single living intrauterine gestation with an estimated gestational age by   ultrasound of 13 weeks and 1 day which is concordant to the clinical age   provided of 12 weeks and 4 days. Estimated due date based on current   ultrasound is May 8, 2022. Fetal heart rate was 147 beats per minute. 2.  Echogenic region near the internal cervical os measures 13 mm. This is   of uncertain significance. This can be re-evaluated on later ultrasounds. 3.  Nonvisualization of either ovary. There are no adnexal masses.            ED Course / Medical Decision Making     Medications   potassium chloride (KLOR-CON M) extended release tablet 40 mEq (has no administration in time range)          Consultations:  None    Procedures:  none    MDM:      Counseling: The emergency provider has spoken with the patient/caregiver and discussed todays results, in addition to providing specific details for the plan of care and counseling regarding the diagnosis and prognosis. Questions are answered at this time and they are agreeable with the plan. Patient understands she must follow-up with OBGYN. She was advised to return to ED if symptoms worsen or if new symptoms develop. Patient remained nontoxic, afebrile, and A&O x4 during this ED visit. They agreed with plan of care, discharge, and importance of follow-up. Patient was in no distress at discharge. Vitals stable. All results reviewed with pt and all questions answered. Assessment      1. Vaginal bleeding in pregnancy    2. Threatened miscarriage      Plan   Discharge to home  Patient condition is good    New Medications     New Prescriptions    No medications on file       Electronically signed by Harry Hayes PA-C   DD: 11/1/21  **This report was transcribed using voice recognition software. Every effort was made to ensure accuracy; however, inadvertent computerized transcription errors may be present.     END OF ED PROVIDER NOTE        Harry Hayes PA-C  11/01/21 ELISE Manley  11/01/21 [id number]

## 2021-11-03 LAB — URINE CULTURE, ROUTINE: NORMAL

## 2021-11-12 PROBLEM — O09.299 HISTORY OF PRE-ECLAMPSIA IN PRIOR PREGNANCY, CURRENTLY PREGNANT: Status: ACTIVE | Noted: 2021-11-12

## 2021-11-12 PROBLEM — E72.12 METHYLENETETRAHYDROFOLATE REDUCTASE (MTHFR) DEFICIENCY AFFECTING PREGNANCY, ANTEPARTUM (HCC): Status: ACTIVE | Noted: 2021-11-12

## 2021-11-12 PROBLEM — O99.280 METHYLENETETRAHYDROFOLATE REDUCTASE (MTHFR) DEFICIENCY AFFECTING PREGNANCY, ANTEPARTUM (HCC): Status: ACTIVE | Noted: 2021-11-12

## 2021-11-18 ENCOUNTER — OFFICE VISIT (OUTPATIENT)
Dept: PRIMARY CARE CLINIC | Age: 26
End: 2021-11-18
Payer: COMMERCIAL

## 2021-11-18 VITALS
HEART RATE: 99 BPM | SYSTOLIC BLOOD PRESSURE: 94 MMHG | OXYGEN SATURATION: 98 % | WEIGHT: 166.2 LBS | TEMPERATURE: 97.3 F | HEIGHT: 62 IN | BODY MASS INDEX: 30.59 KG/M2 | DIASTOLIC BLOOD PRESSURE: 64 MMHG | RESPIRATION RATE: 18 BRPM

## 2021-11-18 DIAGNOSIS — Z3A.16 16 WEEKS GESTATION OF PREGNANCY: ICD-10-CM

## 2021-11-18 DIAGNOSIS — J06.9 VIRAL URI: Primary | ICD-10-CM

## 2021-11-18 DIAGNOSIS — Z20.822 EXPOSURE TO COVID-19 VIRUS: ICD-10-CM

## 2021-11-18 DIAGNOSIS — R11.2 NON-INTRACTABLE VOMITING WITH NAUSEA, UNSPECIFIED VOMITING TYPE: ICD-10-CM

## 2021-11-18 LAB
Lab: NORMAL
PERFORMING INSTRUMENT: NORMAL
QC PASS/FAIL: NORMAL
SARS-COV-2, POC: NORMAL

## 2021-11-18 PROCEDURE — 1036F TOBACCO NON-USER: CPT | Performed by: STUDENT IN AN ORGANIZED HEALTH CARE EDUCATION/TRAINING PROGRAM

## 2021-11-18 PROCEDURE — G8484 FLU IMMUNIZE NO ADMIN: HCPCS | Performed by: STUDENT IN AN ORGANIZED HEALTH CARE EDUCATION/TRAINING PROGRAM

## 2021-11-18 PROCEDURE — 99214 OFFICE O/P EST MOD 30 MIN: CPT | Performed by: STUDENT IN AN ORGANIZED HEALTH CARE EDUCATION/TRAINING PROGRAM

## 2021-11-18 PROCEDURE — G8427 DOCREV CUR MEDS BY ELIG CLIN: HCPCS | Performed by: STUDENT IN AN ORGANIZED HEALTH CARE EDUCATION/TRAINING PROGRAM

## 2021-11-18 PROCEDURE — G8419 CALC BMI OUT NRM PARAM NOF/U: HCPCS | Performed by: STUDENT IN AN ORGANIZED HEALTH CARE EDUCATION/TRAINING PROGRAM

## 2021-11-18 PROCEDURE — 87426 SARSCOV CORONAVIRUS AG IA: CPT | Performed by: STUDENT IN AN ORGANIZED HEALTH CARE EDUCATION/TRAINING PROGRAM

## 2021-11-18 RX ORDER — ONDANSETRON 4 MG/1
4 TABLET, ORALLY DISINTEGRATING ORAL 3 TIMES DAILY PRN
Qty: 21 TABLET | Refills: 0 | Status: ON HOLD
Start: 2021-11-18 | End: 2022-05-07 | Stop reason: HOSPADM

## 2021-11-18 RX ORDER — FLUTICASONE PROPIONATE 50 MCG
2 SPRAY, SUSPENSION (ML) NASAL DAILY
Qty: 16 G | Refills: 0 | Status: SHIPPED
Start: 2021-11-18 | End: 2022-06-17 | Stop reason: ALTCHOICE

## 2021-11-18 ASSESSMENT — ENCOUNTER SYMPTOMS
SORE THROAT: 0
NAUSEA: 1
SHORTNESS OF BREATH: 1
RHINORRHEA: 1
COUGH: 0
DIARRHEA: 0
VOMITING: 1

## 2021-11-18 NOTE — PROGRESS NOTES
WALK-IN CARE CLINIC VISIT    21  Name: Porsche Franz   : 1995   Age: 31 y.o. Sex: female        Assessment & Plan:       ICD-10-CM    1. Viral URI  J06.9 POCT COVID-19, Antigen     COVID-19 Ambulatory     fluticasone (FLONASE) 50 MCG/ACT nasal spray   2. Exposure to COVID-19 virus  Z20.822 COVID-19 Ambulatory   3. 16 weeks gestation of pregnancy  Z3A.16 ondansetron (ZOFRAN-ODT) 4 MG disintegrating tablet   4. Non-intractable vomiting with nausea, unspecified vomiting type  R11.2 ondansetron (ZOFRAN-ODT) 4 MG disintegrating tablet       No evidence of acute bacterial infection. History and exam consistent with viral URI. Patient also had COVID exposure. Rapid COVID test negative, sendout COVID test pending. Provided Zofran for nausea and vomiting. Recommended monoclonal antibody treatment if positive due to pregnancy. Provided self-isolation instructions, close contact parameters, supportive care instructions, ED precautions. Counseled patient regarding above diagnosis, including possible risks and complications, especially if left uncontrolled. Counseled patient as appropriate and relevant regarding any possible side effects, risks, and alternatives to treatment; the patient verbalizes understanding, and is in agreement with the plan as detailed above. All educational materials and instructions were discussed and included on the After Visit Summary. All questions answered to the patient's satisfaction. The patient was advised to call for any concerns or return if any of the signs or symptoms worsen. This provider was wearing N95 mask and shield. The patient was wearing surgical mask. We practiced social distancing when appropriate during visit due to COVID-19 pandemic.      Subjective:     Chief Complaint   Patient presents with    Other     fever,headache,suffy nose ,lethargy loss of appetite, nausea    Other     is 16 weeks pregnant       Patient reports fever, headache, congestion, nausea, vomiting since yesterday  Tmax 100.4F  She has vomiting but also is 16w pregnant  Was exposed to son who was exposed to cousin with Rodney 5 days ago  Denies other known sick contacts  Patient is not vaccinated for COVID    Review of Systems   Constitutional: Positive for chills and fever. HENT: Positive for congestion and rhinorrhea. Negative for sore throat. Respiratory: Positive for shortness of breath. Negative for cough. Cardiovascular: Negative for chest pain. Gastrointestinal: Positive for nausea and vomiting. Negative for diarrhea. Musculoskeletal: Negative for arthralgias and myalgias. Skin: Negative for rash. Neurological: Positive for headaches. Negative for light-headedness.        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, antepartum ([facility]    History of pre-eclampsia in prior pregnancy, currently pregnant        Past Medical History:   Diagnosis Date    Asthma     not currently treated for    Bipolar 1 disorder ([facility]     Concussion 6/10/2015    Depression     Neck strain 6/10/2015    Panic attack     Pre-eclampsia     Thrombophilia complicating pregnancy, antepartum ([facility]        Past Surgical History:   Procedure Laterality Date    TONSILLECTOMY      WISDOM TOOTH EXTRACTION         Family History   Problem Relation Age of Onset    Depression Mother     Diabetes Maternal Grandmother     Diabetes Paternal Grandmother     Depression Brother     Diabetes Son        Medications:     Current Outpatient Medications:     aspirin 81 MG chewable tablet, Take 81 mg by mouth daily, Disp: , Rfl:     Prenatal Vit-Fe Fumarate-FA (PRENATAL PLUS) 27-1 MG TABS, Take 1 tablet by mouth daily, Disp: 30 tablet, Rfl: 11    enoxaparin (LOVENOX) 40 MG/0.4ML injection, Inject 0.4 mLs into the skin daily, Disp: 12 mL, Rfl: 6    Allergies: Allergies   Allergen Reactions    Zyrtec [Cetirizine] Itching     Itchy/watery eyes, congestion, runny nose       Social History:     Social History     Socioeconomic History    Marital status: Single     Spouse name: Not on file    Number of children: 1    Years of education: Not on file    Highest education level: Not on file   Occupational History    Occupation: [employer]     Employer: [employer]   Tobacco Use    Smoking status: Former Smoker     Packs/day: 0.50     Years: 6.00     Pack years: 3.00     Types: Cigarettes     Quit date: 4/24/2018     Years since quitting: 3.5    Smokeless tobacco: Never Used   Vaping Use    Vaping Use: Every day    Substances: Always   Substance and Sexual Activity    Alcohol use: Not Currently     Alcohol/week: 0.0 standard drinks    Drug use: No    Sexual activity: Yes     Partners: Male   Other Topics Concern    Not on file   Social History Narrative    Just finished school for [employer]. Starting a job at [employer]. Has a 35 year old son. Social Determinants of Health     Financial Resource Strain:     Difficulty of Paying Living Expenses: Not on file   Food Insecurity:     Worried About Running Out of Food in the Last Year: Not on file    Darling of Food in the Last Year: Not on file   Transportation Needs:     Lack of Transportation (Medical): Not on file    Lack of Transportation (Non-Medical):  Not on file   Physical Activity:     Days of Exercise per Week: Not on file    Minutes of Exercise per Session: Not on file   Stress:     Feeling of Stress : Not on file   Social Connections:     Frequency of Communication with Friends and Family: Not on file    Frequency of Social Gatherings with Friends and Family: Not on file    Attends Mandaen Services: Not on file   [employer] of Clubs or Organizations: Not on file    Attends Club or Organization Meetings: Not on file    Marital Status: Not on file   Intimate Partner Violence:     Fear of Current or Ex-Partner: Not on file    Emotionally Abused: Not on file    Physically Abused: Not on file    Sexually Abused: Not on file   Housing Stability:     Unable to Pay for Housing in the Last Year: Not on file    Number of [place] in the Last Year: Not on file    Unstable Housing in the Last Year: Not on file       Physical Exam:     Vitals:    11/18/21 1132   BP: 94/64   Pulse: 99   Resp: 18   Temp: 97.3 °F (36.3 °C)   SpO2: 98%   Weight: 166 lb 3.2 oz (75.4 kg)   Height: 5' 2\" (1.575 m)        Physical Exam  Vitals and nursing note reviewed. Constitutional:       General: She is not in acute distress. Appearance: Normal appearance. She is obese. She is not ill-appearing or diaphoretic. HENT:      Right Ear: Tympanic membrane, ear canal and external ear normal.      Left Ear: Tympanic membrane, ear canal and external ear normal.      Nose: Mucosal edema and congestion present. No rhinorrhea. Mouth/Throat:      Mouth: Mucous membranes are moist.      Pharynx: Oropharynx is clear. No oropharyngeal exudate or posterior oropharyngeal erythema. Eyes:      Extraocular Movements: Extraocular movements intact. Conjunctiva/sclera: Conjunctivae normal.   Cardiovascular:      Rate and Rhythm: Normal rate and regular rhythm. Heart sounds: Normal heart sounds. Pulmonary:      Effort: Pulmonary effort is normal. No respiratory distress. Breath sounds: Normal breath sounds. No wheezing, rhonchi or rales. Musculoskeletal:      Cervical back: Normal range of motion and neck supple. Skin:     General: Skin is warm and dry. Neurological:      Mental Status: She is alert and oriented to person, place, and time.          Testing:     Orders Placed This Encounter   Procedures    COVID-19 Ambulatory     Standing Status: Future     Number of Occurrences:   1     Standing Expiration Date:   11/18/2022     Scheduling Instructions:      Saline media preferred given current shortage of viral transport media but both acceptable     Order Specific Question:   Is this test for diagnosis or screening? Answer:   Diagnosis of ill patient     Order Specific Question:   Symptomatic for COVID-19 as defined by CDC? Answer:   Yes     Order Specific Question:   Date of Symptom Onset     Answer:   11/17/2021     Order Specific Question:   Hospitalized for COVID-19? Answer:   No     Order Specific Question:   Admitted to ICU for COVID-19? Answer:   No     Order Specific Question:   Previously tested for COVID-19? Answer: Yes    POCT COVID-19, Antigen     Order Specific Question:   Is this test for diagnosis or screening? Answer:   Diagnosis of ill patient     Order Specific Question:   Symptomatic for COVID-19 as defined by CDC? Answer:   Yes     Order Specific Question:   Date of Symptom Onset     Answer:   11/17/2021     Order Specific Question:   Hospitalized for COVID-19? Answer:   No     Order Specific Question:   Admitted to ICU for COVID-19? Answer:   No     Order Specific Question:   Previously tested for COVID-19?      Answer:   Yes        Recent Results   POCT COVID-19, Antigen    Collection Time: 11/18/21 11:20 AM   Result Value Ref Range    SARS-COV-2, POC Not-Detected Not Detected    Lot Number [id number]     QC Pass/Fail PASS     Performing Instrument [id number]

## 2022-04-27 PROBLEM — L20.9 ATOPIC DERMATITIS: Status: ACTIVE | Noted: 2022-04-27

## 2022-04-27 PROBLEM — J20.9 ACUTE BRONCHITIS: Status: ACTIVE | Noted: 2022-04-27

## 2022-04-27 PROBLEM — J30.9 ALLERGIC RHINITIS: Status: ACTIVE | Noted: 2022-04-27

## 2022-04-27 PROBLEM — J45.20 MILD INTERMITTENT ASTHMA: Status: ACTIVE | Noted: 2022-04-27

## 2022-04-27 PROBLEM — K52.9 GASTROENTERITIS: Status: ACTIVE | Noted: 2022-04-27

## 2022-04-27 PROBLEM — A08.4 VIRAL GASTROENTERITIS: Status: ACTIVE | Noted: 2022-04-27

## 2022-04-27 PROBLEM — J45.909 REACTIVE AIRWAY DISEASE: Status: ACTIVE | Noted: 2022-04-27

## 2022-05-05 ENCOUNTER — HOSPITAL ENCOUNTER (INPATIENT)
Age: 27
LOS: 2 days | Discharge: HOME | DRG: 560 | End: 2022-05-07
Attending: OBSTETRICS & GYNECOLOGY | Admitting: OBSTETRICS & GYNECOLOGY
Payer: COMMERCIAL

## 2022-05-05 ENCOUNTER — ANESTHESIA (OUTPATIENT)
Dept: LABOR AND DELIVERY | Age: 27
DRG: 560 | End: 2022-05-05
Payer: COMMERCIAL

## 2022-05-05 ENCOUNTER — APPOINTMENT (OUTPATIENT)
Dept: LABOR AND DELIVERY | Age: 27
DRG: 560 | End: 2022-05-05
Payer: COMMERCIAL

## 2022-05-05 ENCOUNTER — ANESTHESIA EVENT (OUTPATIENT)
Dept: LABOR AND DELIVERY | Age: 27
DRG: 560 | End: 2022-05-05
Payer: COMMERCIAL

## 2022-05-05 PROBLEM — J20.9 ACUTE BRONCHITIS: Status: RESOLVED | Noted: 2022-04-27 | Resolved: 2022-05-05

## 2022-05-05 PROBLEM — J45.909 REACTIVE AIRWAY DISEASE: Status: RESOLVED | Noted: 2022-04-27 | Resolved: 2022-05-05

## 2022-05-05 PROBLEM — J30.9 ALLERGIC RHINITIS: Status: RESOLVED | Noted: 2022-04-27 | Resolved: 2022-05-05

## 2022-05-05 PROBLEM — K52.9 GASTROENTERITIS: Status: RESOLVED | Noted: 2022-04-27 | Resolved: 2022-05-05

## 2022-05-05 PROBLEM — Z3A.39 39 WEEKS GESTATION OF PREGNANCY: Status: ACTIVE | Noted: 2022-05-05

## 2022-05-05 PROBLEM — L20.9 ATOPIC DERMATITIS: Status: RESOLVED | Noted: 2022-04-27 | Resolved: 2022-05-05

## 2022-05-05 PROBLEM — A08.4 VIRAL GASTROENTERITIS: Status: RESOLVED | Noted: 2022-04-27 | Resolved: 2022-05-05

## 2022-05-05 PROBLEM — F41.0 SEVERE ANXIETY WITH PANIC: Status: RESOLVED | Noted: 2019-01-03 | Resolved: 2022-05-05

## 2022-05-05 LAB
ABO/RH: NORMAL
AMPHETAMINE SCREEN, URINE: NOT DETECTED
ANTIBODY SCREEN: NORMAL
BARBITURATE SCREEN URINE: NOT DETECTED
BENZODIAZEPINE SCREEN, URINE: NOT DETECTED
CANNABINOID SCREEN URINE: NOT DETECTED
COCAINE METABOLITE SCREEN URINE: NOT DETECTED
FENTANYL SCREEN, URINE: NOT DETECTED
HCT VFR BLD CALC: 23.9 % (ref 34–48)
HEMOGLOBIN: 6.9 G/DL (ref 11.5–15.5)
Lab: NORMAL
MCH RBC QN AUTO: 18.5 PG (ref 26–35)
MCHC RBC AUTO-ENTMCNC: 28.9 % (ref 32–34.5)
MCV RBC AUTO: 64.1 FL (ref 80–99.9)
METHADONE SCREEN, URINE: NOT DETECTED
OPIATE SCREEN URINE: NOT DETECTED
OXYCODONE URINE: NOT DETECTED
PDW BLD-RTO: 19.2 FL (ref 11.5–15)
PHENCYCLIDINE SCREEN URINE: NOT DETECTED
PLATELET # BLD: 178 E9/L (ref 130–450)
PMV BLD AUTO: 9.7 FL (ref 7–12)
RBC # BLD: 3.73 E12/L (ref 3.5–5.5)
WBC # BLD: 7.4 E9/L (ref 4.5–11.5)

## 2022-05-05 PROCEDURE — 1220000001 HC SEMI PRIVATE L&D R&B

## 2022-05-05 PROCEDURE — 36415 COLL VENOUS BLD VENIPUNCTURE: CPT

## 2022-05-05 PROCEDURE — 86850 RBC ANTIBODY SCREEN: CPT

## 2022-05-05 PROCEDURE — 3700000025 EPIDURAL BLOCK: Performed by: ANESTHESIOLOGY

## 2022-05-05 PROCEDURE — 86923 COMPATIBILITY TEST ELECTRIC: CPT

## 2022-05-05 PROCEDURE — 86900 BLOOD TYPING SEROLOGIC ABO: CPT

## 2022-05-05 PROCEDURE — 6360000002 HC RX W HCPCS: Performed by: OBSTETRICS & GYNECOLOGY

## 2022-05-05 PROCEDURE — 2580000003 HC RX 258: Performed by: OBSTETRICS & GYNECOLOGY

## 2022-05-05 PROCEDURE — 99222 1ST HOSP IP/OBS MODERATE 55: CPT | Performed by: PHYSICIAN ASSISTANT

## 2022-05-05 PROCEDURE — 2500000003 HC RX 250 WO HCPCS: Performed by: ANESTHESIOLOGY

## 2022-05-05 PROCEDURE — 80307 DRUG TEST PRSMV CHEM ANLYZR: CPT

## 2022-05-05 PROCEDURE — 86901 BLOOD TYPING SEROLOGIC RH(D): CPT

## 2022-05-05 PROCEDURE — 51701 INSERT BLADDER CATHETER: CPT

## 2022-05-05 PROCEDURE — 85027 COMPLETE CBC AUTOMATED: CPT

## 2022-05-05 RX ORDER — SODIUM CHLORIDE 0.9 % (FLUSH) 0.9 %
5-40 SYRINGE (ML) INJECTION EVERY 12 HOURS SCHEDULED
Status: DISCONTINUED | OUTPATIENT
Start: 2022-05-05 | End: 2022-05-06

## 2022-05-05 RX ORDER — NALOXONE HYDROCHLORIDE 0.4 MG/ML
INJECTION, SOLUTION INTRAMUSCULAR; INTRAVENOUS; SUBCUTANEOUS PRN
Status: DISCONTINUED | OUTPATIENT
Start: 2022-05-05 | End: 2022-05-06

## 2022-05-05 RX ORDER — ONDANSETRON 2 MG/ML
4 INJECTION INTRAMUSCULAR; INTRAVENOUS EVERY 6 HOURS PRN
Status: DISCONTINUED | OUTPATIENT
Start: 2022-05-05 | End: 2022-05-06

## 2022-05-05 RX ORDER — SODIUM CHLORIDE 0.9 % (FLUSH) 0.9 %
5-40 SYRINGE (ML) INJECTION PRN
Status: DISCONTINUED | OUTPATIENT
Start: 2022-05-05 | End: 2022-05-06

## 2022-05-05 RX ORDER — SODIUM CHLORIDE 9 MG/ML
INJECTION, SOLUTION INTRAVENOUS PRN
Status: DISCONTINUED | OUTPATIENT
Start: 2022-05-05 | End: 2022-05-06

## 2022-05-05 RX ORDER — DOCUSATE SODIUM 100 MG/1
100 CAPSULE, LIQUID FILLED ORAL 2 TIMES DAILY
Status: DISCONTINUED | OUTPATIENT
Start: 2022-05-05 | End: 2022-05-06

## 2022-05-05 RX ORDER — CALCIUM CARBONATE 200(500)MG
1 TABLET,CHEWABLE ORAL 3 TIMES DAILY
COMMUNITY
End: 2022-10-31

## 2022-05-05 RX ORDER — ACETAMINOPHEN 650 MG
TABLET, EXTENDED RELEASE ORAL
Status: COMPLETED
Start: 2022-05-05 | End: 2022-05-06

## 2022-05-05 RX ORDER — SODIUM CHLORIDE, SODIUM LACTATE, POTASSIUM CHLORIDE, AND CALCIUM CHLORIDE .6; .31; .03; .02 G/100ML; G/100ML; G/100ML; G/100ML
500 INJECTION, SOLUTION INTRAVENOUS PRN
Status: DISCONTINUED | OUTPATIENT
Start: 2022-05-05 | End: 2022-05-06

## 2022-05-05 RX ORDER — SODIUM CHLORIDE, SODIUM LACTATE, POTASSIUM CHLORIDE, CALCIUM CHLORIDE 600; 310; 30; 20 MG/100ML; MG/100ML; MG/100ML; MG/100ML
INJECTION, SOLUTION INTRAVENOUS CONTINUOUS
Status: DISCONTINUED | OUTPATIENT
Start: 2022-05-05 | End: 2022-05-06

## 2022-05-05 RX ORDER — SODIUM CHLORIDE 9 MG/ML
25 INJECTION, SOLUTION INTRAVENOUS PRN
Status: DISCONTINUED | OUTPATIENT
Start: 2022-05-05 | End: 2022-05-06

## 2022-05-05 RX ORDER — SODIUM CHLORIDE, SODIUM LACTATE, POTASSIUM CHLORIDE, AND CALCIUM CHLORIDE .6; .31; .03; .02 G/100ML; G/100ML; G/100ML; G/100ML
1000 INJECTION, SOLUTION INTRAVENOUS PRN
Status: DISCONTINUED | OUTPATIENT
Start: 2022-05-05 | End: 2022-05-06

## 2022-05-05 RX ORDER — ACETAMINOPHEN 325 MG/1
650 TABLET ORAL EVERY 4 HOURS PRN
Status: DISCONTINUED | OUTPATIENT
Start: 2022-05-05 | End: 2022-05-06

## 2022-05-05 RX ORDER — LIDOCAINE HYDROCHLORIDE 10 MG/ML
INJECTION, SOLUTION INFILTRATION; PERINEURAL
Status: COMPLETED
Start: 2022-05-05 | End: 2022-05-06

## 2022-05-05 RX ADMIN — SODIUM CHLORIDE, POTASSIUM CHLORIDE, SODIUM LACTATE AND CALCIUM CHLORIDE: 600; 310; 30; 20 INJECTION, SOLUTION INTRAVENOUS at 10:36

## 2022-05-05 RX ADMIN — Medication 1 MILLI-UNITS/MIN: at 12:15

## 2022-05-05 RX ADMIN — SODIUM CHLORIDE, POTASSIUM CHLORIDE, SODIUM LACTATE AND CALCIUM CHLORIDE: 600; 310; 30; 20 INJECTION, SOLUTION INTRAVENOUS at 10:35

## 2022-05-05 RX ADMIN — Medication 8 ML/HR: at 17:35

## 2022-05-05 ASSESSMENT — LIFESTYLE VARIABLES: SMOKING_STATUS: 1

## 2022-05-05 NOTE — PROGRESS NOTES
at 39w here for scheduled IOL, hx:thrombophilia, last took Lovenox and ASA few days ago. Denies any VB or LOF. Has rare ctxs and great fetal movement. GBS-.

## 2022-05-05 NOTE — PROGRESS NOTES
Lab called stated patient's hemoglobin was critically low at 6.9. Called Dr. Norma Mccarthy office, awaiting return call.

## 2022-05-05 NOTE — PROGRESS NOTES
Patient seen  See Sebastián bryan's note  fht's 110's with accels  Ctx's present  Orders per dr Gerda Wu

## 2022-05-05 NOTE — H&P
Department of Obstetrics and Gynecology  Physician Assistant Obstetrics History and Physical      HISTORY OF PRESENT ILLNESS:      The patient is a 31 y.o.  5 parity 2 at 44 weeks' 0 days' gestation presents to L&D from home for scheduled IOL. Current obstetric history is significant for: Thrombophilia  2 term 's  Pre-eclampsia in prior pregnancy    Estimated Due Date:  2022  Contractions: Rare  Leaking of fluid: No  Bleeding:  No  Perceived fetal movement: Good    Group B Strep: Negative      PAST OB HISTORY:  OB History    Para Term  AB Living   5 2 2   2 2   SAB IAB Ectopic Molar Multiple Live Births           0 2      # Outcome Date GA Lbr Mark/2nd Weight Sex Delivery Anes PTL Lv   5 Current            4 Term 19 39w3d / 00:37 6 lb 10.2 oz (3.01 kg) M Vag-Vacuum EPI N IGNACIO   3 Term 03/10/13 37w1d 05:30 6 lb 9.8 oz (2.999 kg) M Vag-Spont  N IGNACIO   2 AB            1 AB                    Pre-eclampsia:  Yes      :  No      D & C:  No      Cerclage:  No      LEEP:  No      Myomectomy:  No       Labor: No    Past Medical History:  Denies diabetes, cardiac or thyroid disease, seizures  Diagnosis Date    Allergic rhinitis 2022    Asthma     not currently treated for    Bipolar 1 disorder ([facility]     Concussion 6/10/2015    Depression     Neck strain 6/10/2015    Panic attack     Pre-eclampsia     Thrombophilia complicating pregnancy, antepartum ([facility]         Past Surgical History:    Procedure Laterality Date    TONSILLECTOMY      WISDOM TOOTH EXTRACTION          Social History:     reports that she quit smoking about 4 years ago. Her smoking use included cigarettes. She has a 3.00 pack-year smoking history. She has never used smokeless tobacco. She reports previous alcohol use. She reports that she does not use drugs.      Blood type: ABO/Rh O positive     Antibody screen:   Lab Results   Component Value Date    LABANTI NEG 2019       Lab Results Component Value Date    WBC 9.2 11/01/2021    HGB 11.7 11/01/2021    HCT 36.4 11/01/2021    MCV 72.5 (L) 11/01/2021     11/01/2021     Rubella: Immune   RPR: Non-reactive  Hepatitis B Surface Antigen:   Lab Results   Component Value Date    HEPCAB NON REACT 03/11/2013      HIV: Non-reactive  Gonorrhea: Not detected  Chlamydia: Not detected  Group B Strep: Negative      Lab Results   Component Value Date    COVID19 Not Detected 11/18/2021       Medications Prior to Admission:  Medications Prior to Admission: calcium carbonate (TUMS) 500 MG chewable tablet, Take 1 tablet by mouth 3 times daily  omeprazole (PRILOSEC) 20 MG delayed release capsule, Take 1 capsule by mouth every morning (before breakfast)  ferrous sulfate (IRON 325) 325 (65 Fe) MG tablet, Take 1 tablet by mouth 2 times daily  fluticasone (FLONASE) 50 MCG/ACT nasal spray, 2 sprays by Each Nostril route daily  ondansetron (ZOFRAN-ODT) 4 MG disintegrating tablet, Take 1 tablet by mouth 3 times daily as needed for Nausea or Vomiting  aspirin 81 MG chewable tablet, Take 81 mg by mouth daily  Prenatal Vit-Fe Fumarate-FA (PRENATAL PLUS) 27-1 MG TABS, Take 1 tablet by mouth daily  enoxaparin (LOVENOX) 40 MG/0.4ML injection, Inject 0.4 mLs into the skin daily    Allergies:  Zyrtec [cetirizine]    ROS:  Const: No fever, chills, night sweats, no recent unexplained weight gain/loss  HEENT: No blurred vision, double vision; no ear problems; no sore throat, congestion; no running nose. Resp: No cough, no sputum, no pleuritic chest pain, no sob  Cardio: No chest pain, no exertional dyspnea, no PND, no orthopnea, no palpitation, no leg swelling. GI: No dysphagia, no reflux; no abdominal pain, no n/v; no c/d.  No hematochezia    : No dysuria, no frequency, hesitancy; no hematuria  MSK: no joint pain, no myalgia, no change in ROM  Neuro: no focal weakness in extremities, no slurred speech, no double vision, no numbness or tingling in extremities  Endo: no heat/cold intolerance, no polyphagia, polydipsia or polyuria  Hem: no increased bleeding, no bruising, no lymphadenopathy  Skin: no skin changes  Psych: no depressed mood, no suicidal ideation  Pertinent +'s & -'s addressed in HPI    PHYSICAL EXAM:  Ht 5' 2\" (1.575 m)   Wt 167 lb (75.8 kg)   LMP 2021   BMI 30.54 kg/m²     General appearance: Comfortable  Lungs:  CTA bilaterally, good excursion  Heart:  Regular Rate & Rhythm, no murmur noted  Abdomen:  Soft, non-tender, gravid  Uterus: soft, nontender  Fetal heart rate:  Baseline Heart Rate 120; variability: moderate;  accelerations: present;  decelerations: absent  Cervix:  DILATION: Closed  EFFACEMENT:   70%  STATION:  -3 cm  Contraction frequency: occasional  Membranes:  Intact  Extremities: (-) edema    ASSESSMENT:   32 yo  A2 IUP at 39 weeks' 0 days' gestation    Here for scheduled IOL    Fetus: reassuring         Plan: Orders per Dr. Valdez Race:  EFM  Routine admission and labor orders  CBC  Type & Screen  UDS  IVF with LR  Pitocin  Stadol  May have Epidural  Anticipate normal delivery      Electronically signed by Renetta Owusu PA-C on 2022 at 10:48 AM

## 2022-05-05 NOTE — ANESTHESIA PRE PROCEDURE
Department of Anesthesiology  Preprocedure Note       Name:  Dipak Hyman   Age:  31 y.o.  :  1995                                          MRN:  [id number]         Date:  2022      Surgeon: * No surgeons listed *    Procedure: * No procedures listed *    Medications prior to admission:   Prior to Admission medications    Medication Sig Start Date End Date Taking?  Authorizing Provider   calcium carbonate (TUMS) 500 MG chewable tablet Take 1 tablet by mouth 3 times daily   Yes Historical Provider, MD   omeprazole (PRILOSEC) 20 MG delayed release capsule Take 1 capsule by mouth every morning (before breakfast) 3/24/22   Voncile Chimera, DO   ferrous sulfate (IRON 325) 325 (65 Fe) MG tablet Take 1 tablet by mouth 2 times daily 3/24/22   Carlose Chimera, DO   fluticasone [facility]) 50 MCG/ACT nasal spray 2 sprays by Each Nostril route daily 21   Prasanna Frias MD   ondansetron (ZOFRAN-ODT) 4 MG disintegrating tablet Take 1 tablet by mouth 3 times daily as needed for Nausea or Vomiting 21   Prasanna Frias MD   aspirin 81 MG chewable tablet Take 81 mg by mouth daily    Historical Provider, MD   Prenatal Vit-Fe Fumarate-FA (PRENATAL PLUS) 27-1 MG TABS Take 1 tablet by mouth daily 21   Voncile Chimera, DO   enoxaparin (LOVENOX) 40 MG/0.4ML injection Inject 0.4 mLs into the skin daily 21   Voncile Chimera, DO       Current medications:    Current Facility-Administered Medications   Medication Dose Route Frequency Provider Last Rate Last Admin    lactated ringers infusion   IntraVENous Continuous Voncile Chimera,  mL/hr at 22 1431 Rate Verify at 22 1431    lactated ringers bolus  500 mL IntraVENous PRN Voncile Chimera, DO        Or    lactated ringers bolus  1,000 mL IntraVENous PRN Voncile Chimera, DO        sodium chloride flush 0.9 % injection 5-40 mL  5-40 mL IntraVENous 2 times per day Voncile Chimera, DO        sodium chloride flush 0.9 % injection 5-40 mL  5-40 mL IntraVENous PRN Stephania Aissatou, DO        0.9 % sodium chloride infusion  25 mL IntraVENous PRN Stephania Aissatou, DO        [facility]ron [facility]) injection 4 mg  4 mg IntraVENous Q6H PRN Stephania Aissatou, DO        acetaminophen (TYLENOL) tablet 650 mg  650 mg Oral Q4H PRN Stephania Aissatou, DO        benzocaine-menthol (DERMOPLAST) 20-0.5 % spray   Topical PRN Stephania Aissatou, DO        docusate sodium (COLACE) capsule 100 mg  100 mg Oral BID Stephania Aissatou, DO        oxytocin (PITOCIN) 30 units in 500 mL infusion  1-20 clive-units/min IntraVENous Continuous Stephania Aissatou, DO 8 mL/hr at 05/05/22 1431 8 clive-units/min at 05/05/22 1431    butorphanol (STADOL) injection 2 mg  2 mg IntraVENous Once Stephania Aissatou, DO           Allergies:     Allergies   Allergen Reactions    Zyrtec [Cetirizine] Itching     Itchy/watery eyes, congestion, runny nose       Problem List:    Patient Active Problem List   Diagnosis Code    MTHFR mutation Z15.89    Cigarette smoker one half pack a day or less F17.210    [facility] F31.9    Long term current use of anticoagulant therapy Z79.01    Severe anxiety with panic F41.0    Multiparity Z64.1    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 O99.113, D68.59       Past Medical History:        Diagnosis Date    Allergic rhinitis 4/27/2022    Asthma     not currently treated for    [facility] 1 disorder ([facility]     Concussion 6/10/2015    Depression     Neck strain 6/10/2015    Panic attack     Pre-eclampsia     Thrombophilia complicating pregnancy, antepartum ([facility]        Past Surgical History:        Procedure Laterality Date    TONSILLECTOMY      WISDOM TOOTH EXTRACTION         Social History:    Social History     Tobacco Use    Smoking status: Former Smoker     Packs/day: 0.50     Years: 6.00     Pack years: 3.00     Types: Cigarettes     Quit date: 2018     Years since quittin.0    Smokeless tobacco: Never Used   Substance Use Topics    Alcohol use: Not Currently     Alcohol/week: 0.0 standard drinks                                Counseling given: Not Answered      Vital Signs (Current):   Vitals:    22 1009 22 1225 22 1255 22 1426   BP:  114/65 117/67 (!) 116/55   Pulse:  92 83 83   Weight: 167 lb (75.8 kg)      Height: 5' 2\" (1.575 m)                                                 BP Readings from Last 3 Encounters:   22 (!) 116/55   22 124/72   22 120/78       NPO Status:   Ate last 22 0930, water now. BMI:   Wt Readings from Last 3 Encounters:   22 167 lb (75.8 kg)   22 166 lb (75.3 kg)   22 168 lb (76.2 kg)     Body mass index is 30.54 kg/m².     CBC:   Lab Results   Component Value Date    WBC 7.4 2022    RBC 3.73 2022    HGB 6.9 2022    HCT 23.9 2022    MCV 64.1 2022    RDW 19.2 2022     2022       CMP:   Lab Results   Component Value Date     2021    K 3.1 2021     2021    CO2 22 2021    BUN 5 2021    CREATININE 0.5 2021    GFRAA >60 2021    LABGLOM >60 2021    GLUCOSE 108 2021    GLUCOSE 69 2012    PROT 7.9 2021    CALCIUM 9.5 2021    BILITOT <0.2 2021 ALKPHOS 74 11/01/2021    AST 17 11/01/2021    ALT 12 11/01/2021       POC Tests: No results for input(s): POCGLU, POCNA, POCK, POCCL, POCBUN, POCHEMO, POCHCT in the last 72 hours. Coags:   Lab Results   Component Value Date    PROTIME 11.2 07/28/2019    INR 1.0 07/28/2019    APTT 28.2 07/28/2019       HCG (If Applicable):   Lab Results   Component Value Date    PREGTESTUR POSITIVE 09/14/2021        ABGs: No results found for: PHART, PO2ART, IJR4QJO, IRR2RCP, BEART, T6YFCSYV     Type & Screen (If Applicable):  No results found for: LABABO, LABRH    Drug/Infectious Status (If Applicable):  Lab Results   Component Value Date    HEPCAB NON REACT 03/11/2013       COVID-19 Screening (If Applicable):   Lab Results   Component Value Date    COVID19 Not Detected 11/18/2021    COVID19 Not-Detected 11/18/2021           Anesthesia Evaluation  Nursing notes reviewed no history of anesthetic complications:   Airway: Mallampati: II  TM distance: >3 FB   Neck ROM: full  Mouth opening: > = 3 FB Dental:          Pulmonary: breath sounds clear to auscultation  (+) asthma: exercise-induced asthma, allergic asthma, seasonal asthma, current smoker (vaping)                           Cardiovascular:  Exercise tolerance: good (>4 METS),   (+) hypertension:,         Rhythm: regular  Rate: normal                    Neuro/Psych:   (+) psychiatric history: stable without treatmentdepression/anxiety             GI/Hepatic/Renal:   (+) GERD: poorly controlled,           Endo/Other:    (+) blood dyscrasia (MTHFR, LAST ASPIRIN AND LOVENOX WAS 5/1/22 per patient. )::., .                  ROS comment: NOSE JEWELRY IN PLACE Abdominal:             Vascular:           ROS comment: MTHFR. Other Findings:             Anesthesia Plan      epidural     ASA 3             Anesthetic plan and risks discussed with patient and spouse Kurt Mcnair). Use of blood products discussed with patient and spouse whom consented to blood products.    Plan discussed with CRNA.                  Lorri West RN   5/5/2022

## 2022-05-05 NOTE — ANESTHESIA PROCEDURE NOTES
Epidural Block    Patient location during procedure: OB  Reason for block: labor epidural  Staffing  Performed: other anesthesia staff and resident/CRNA   Anesthesiologist: Trinh Rowell DO  Resident/CRNA: Rosalva Rubi APRN - IRAM  Other anesthesia staff: Kaylene Wan RN  Preanesthetic Checklist  Completed: patient identified, IV checked, site marked, risks and benefits discussed, surgical consent, monitors and equipment checked, pre-op evaluation, timeout performed, anesthesia consent given, oxygen available and patient being monitored  Epidural  Patient position: sitting  Prep: ChloraPrep  Patient monitoring: continuous pulse ox and frequent blood pressure checks  Approach: midline  Location: L2-3  Injection technique: KEYANNA air  Provider prep: mask and sterile gloves  Needle  Needle type: Tuohy   Needle gauge: 18 G  Needle length: 3.5 in  Needle insertion depth: 4.5 cm  Catheter type: end hole  Catheter size: 20 G (20g)  Catheter at skin depth: 12 cm  Test dose: negativeCatheter Secured: tegaderm and tape  Assessment  Hemodynamics: stable  Attempts: 2  Additional Notes  SRNA first attempt resulted in wet tap. Second attempt inserted by CRNA, patient is comfortable with test dose. Instructed RN to call anesthesia when patient becomes uncomfortable to start infusion.

## 2022-05-06 ENCOUNTER — ANESTHESIA EVENT (OUTPATIENT)
Dept: MOTHER INFANT UNIT | Age: 27
DRG: 560 | End: 2022-05-06
Payer: COMMERCIAL

## 2022-05-06 ENCOUNTER — ANESTHESIA (OUTPATIENT)
Dept: MOTHER INFANT UNIT | Age: 27
DRG: 560 | End: 2022-05-06
Payer: COMMERCIAL

## 2022-05-06 LAB
AMPHETAMINE SCREEN, URINE: NOT DETECTED
BARBITURATE SCREEN URINE: NOT DETECTED
BENZODIAZEPINE SCREEN, URINE: NOT DETECTED
CANNABINOID SCREEN URINE: NOT DETECTED
COCAINE METABOLITE SCREEN URINE: NOT DETECTED
FENTANYL SCREEN, URINE: NOT DETECTED
HCT VFR BLD CALC: 25.7 % (ref 34–48)
HEMOGLOBIN: 7.6 G/DL (ref 11.5–15.5)
Lab: NORMAL
METHADONE SCREEN, URINE: NOT DETECTED
OPIATE SCREEN URINE: NOT DETECTED
OXYCODONE URINE: NOT DETECTED
PHENCYCLIDINE SCREEN URINE: NOT DETECTED

## 2022-05-06 PROCEDURE — 85014 HEMATOCRIT: CPT

## 2022-05-06 PROCEDURE — 6370000000 HC RX 637 (ALT 250 FOR IP): Performed by: OBSTETRICS & GYNECOLOGY

## 2022-05-06 PROCEDURE — 2500000003 HC RX 250 WO HCPCS: Performed by: NURSE ANESTHETIST, CERTIFIED REGISTERED

## 2022-05-06 PROCEDURE — 88307 TISSUE EXAM BY PATHOLOGIST: CPT

## 2022-05-06 PROCEDURE — 90471 IMMUNIZATION ADMIN: CPT | Performed by: OBSTETRICS & GYNECOLOGY

## 2022-05-06 PROCEDURE — 51701 INSERT BLADDER CATHETER: CPT

## 2022-05-06 PROCEDURE — 6360000002 HC RX W HCPCS: Performed by: OBSTETRICS & GYNECOLOGY

## 2022-05-06 PROCEDURE — 80307 DRUG TEST PRSMV CHEM ANLYZR: CPT

## 2022-05-06 PROCEDURE — 1220000000 HC SEMI PRIVATE OB R&B

## 2022-05-06 PROCEDURE — 10907ZC DRAINAGE OF AMNIOTIC FLUID, THERAPEUTIC FROM PRODUCTS OF CONCEPTION, VIA NATURAL OR ARTIFICIAL OPENING: ICD-10-PCS | Performed by: OBSTETRICS & GYNECOLOGY

## 2022-05-06 PROCEDURE — 90715 TDAP VACCINE 7 YRS/> IM: CPT | Performed by: OBSTETRICS & GYNECOLOGY

## 2022-05-06 PROCEDURE — 3700000025 EPIDURAL BLOCK

## 2022-05-06 PROCEDURE — 85018 HEMOGLOBIN: CPT

## 2022-05-06 PROCEDURE — 36415 COLL VENOUS BLD VENIPUNCTURE: CPT

## 2022-05-06 PROCEDURE — 7200000001 HC VAGINAL DELIVERY

## 2022-05-06 RX ORDER — ONDANSETRON 4 MG/1
8 TABLET, ORALLY DISINTEGRATING ORAL EVERY 8 HOURS PRN
Status: DISCONTINUED | OUTPATIENT
Start: 2022-05-06 | End: 2022-05-07 | Stop reason: HOSPADM

## 2022-05-06 RX ORDER — FERROUS SULFATE 325(65) MG
325 TABLET ORAL 2 TIMES DAILY WITH MEALS
Status: DISCONTINUED | OUTPATIENT
Start: 2022-05-06 | End: 2022-05-07 | Stop reason: HOSPADM

## 2022-05-06 RX ORDER — ACETAMINOPHEN 325 MG/1
650 TABLET ORAL EVERY 4 HOURS PRN
Status: DISCONTINUED | OUTPATIENT
Start: 2022-05-06 | End: 2022-05-07 | Stop reason: HOSPADM

## 2022-05-06 RX ORDER — ONDANSETRON 2 MG/ML
4 INJECTION INTRAMUSCULAR; INTRAVENOUS EVERY 6 HOURS PRN
Status: DISCONTINUED | OUTPATIENT
Start: 2022-05-06 | End: 2022-05-07 | Stop reason: HOSPADM

## 2022-05-06 RX ORDER — IBUPROFEN 800 MG/1
800 TABLET ORAL EVERY 8 HOURS PRN
Status: DISCONTINUED | OUTPATIENT
Start: 2022-05-06 | End: 2022-05-07 | Stop reason: HOSPADM

## 2022-05-06 RX ORDER — MODIFIED LANOLIN
OINTMENT (GRAM) TOPICAL PRN
Status: DISCONTINUED | OUTPATIENT
Start: 2022-05-06 | End: 2022-05-07 | Stop reason: HOSPADM

## 2022-05-06 RX ORDER — BUPIVACAINE HYDROCHLORIDE 2.5 MG/ML
INJECTION, SOLUTION EPIDURAL; INFILTRATION; INTRACAUDAL PRN
Status: DISCONTINUED | OUTPATIENT
Start: 2022-05-06 | End: 2022-05-06 | Stop reason: SDUPTHER

## 2022-05-06 RX ORDER — SIMETHICONE 80 MG
80 TABLET,CHEWABLE ORAL EVERY 6 HOURS PRN
Status: DISCONTINUED | OUTPATIENT
Start: 2022-05-06 | End: 2022-05-07 | Stop reason: HOSPADM

## 2022-05-06 RX ORDER — DOCUSATE SODIUM 100 MG/1
100 CAPSULE, LIQUID FILLED ORAL 2 TIMES DAILY
Status: DISCONTINUED | OUTPATIENT
Start: 2022-05-06 | End: 2022-05-07 | Stop reason: HOSPADM

## 2022-05-06 RX ORDER — BISACODYL 10 MG
10 SUPPOSITORY, RECTAL RECTAL DAILY PRN
Status: DISCONTINUED | OUTPATIENT
Start: 2022-05-06 | End: 2022-05-07 | Stop reason: HOSPADM

## 2022-05-06 RX ORDER — HYDROCODONE BITARTRATE AND ACETAMINOPHEN 5; 325 MG/1; MG/1
1 TABLET ORAL EVERY 6 HOURS PRN
Status: DISCONTINUED | OUTPATIENT
Start: 2022-05-06 | End: 2022-05-07 | Stop reason: HOSPADM

## 2022-05-06 RX ORDER — BUPIVACAINE HYDROCHLORIDE 2.5 MG/ML
INJECTION, SOLUTION EPIDURAL; INFILTRATION; INTRACAUDAL
Status: COMPLETED
Start: 2022-05-06 | End: 2022-05-06

## 2022-05-06 RX ADMIN — DOCUSATE SODIUM 100 MG: 100 CAPSULE, LIQUID FILLED ORAL at 20:32

## 2022-05-06 RX ADMIN — DOCUSATE SODIUM 100 MG: 100 CAPSULE, LIQUID FILLED ORAL at 08:40

## 2022-05-06 RX ADMIN — IBUPROFEN 800 MG: 800 TABLET, FILM COATED ORAL at 20:31

## 2022-05-06 RX ADMIN — HYDROCODONE BITARTRATE AND ACETAMINOPHEN 1 TABLET: 5; 325 TABLET ORAL at 10:59

## 2022-05-06 RX ADMIN — BUPIVACAINE HYDROCHLORIDE 5 ML: 2.5 INJECTION, SOLUTION EPIDURAL; INFILTRATION; INTRACAUDAL; PERINEURAL at 01:57

## 2022-05-06 RX ADMIN — HYDROCODONE BITARTRATE AND ACETAMINOPHEN 1 TABLET: 5; 325 TABLET ORAL at 19:15

## 2022-05-06 RX ADMIN — TETANUS TOXOID, REDUCED DIPHTHERIA TOXOID AND ACELLULAR PERTUSSIS VACCINE, ADSORBED 0.5 ML: 5; 2.5; 8; 8; 2.5 SUSPENSION INTRAMUSCULAR at 21:37

## 2022-05-06 RX ADMIN — IBUPROFEN 800 MG: 800 TABLET, FILM COATED ORAL at 08:40

## 2022-05-06 RX ADMIN — FERROUS SULFATE TAB 325 MG (65 MG ELEMENTAL FE) 325 MG: 325 (65 FE) TAB at 19:15

## 2022-05-06 RX ADMIN — FERROUS SULFATE TAB 325 MG (65 MG ELEMENTAL FE) 325 MG: 325 (65 FE) TAB at 08:40

## 2022-05-06 RX ADMIN — Medication: at 03:30

## 2022-05-06 ASSESSMENT — PAIN DESCRIPTION - LOCATION: LOCATION: NECK;SHOULDER

## 2022-05-06 ASSESSMENT — PAIN SCALES - GENERAL
PAINLEVEL_OUTOF10: 3
PAINLEVEL_OUTOF10: 6
PAINLEVEL_OUTOF10: 6
PAINLEVEL_OUTOF10: 5

## 2022-05-06 ASSESSMENT — PAIN DESCRIPTION - DESCRIPTORS: DESCRIPTORS: ACHING;NAGGING;TENDER

## 2022-05-06 NOTE — FLOWSHEET NOTE
Complaints of neck pain. Describes as tight, sore, discomfort. States in gets better when laying flat. Encouraged fluids and caffeine. Pain medication given per order. Vitals stable. No headaches, blurred vision, or nausea. Will re-evaluate.

## 2022-05-06 NOTE — FLOWSHEET NOTE
Complaints of neck pain again. States it feels a little better sitting up than it did before. Wants to try stronger pain medication. Norco given per order. Patient satisfied with pain interventions.

## 2022-05-06 NOTE — PROGRESS NOTES
L&D PROGRESS NOTE:    Patient:  Cielo Skinner     Admit Date:  5/5/2022  9:37 AM  Medical Record Number:  [id number]   Today's Date: 5/5/2022    S: Dr Alexa Saavedra DO requesting AROM. O:   Vitals:    05/05/22 1950 05/05/22 2020 05/05/22 2050 05/05/22 2120   BP: 108/68 (!) 114/56 116/65 118/66   Pulse: 89 89 84 90   Resp:       Temp:       TempSrc:       SpO2:       Weight:       Height:         FHR:  Reassuring. Cervix: 4 cm / 70% / soft / -1. TOCO:  2 minutes-4 minutes. A:26 y.o. 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 Ruptured clear fluid. IV bolus/O2/position changes prn  See orders per Dr. Alexa Saavedra DO. Sammy Rodriguez MD, M.D.  FACOG  5/5/2022 9:55 PM

## 2022-05-06 NOTE — PROGRESS NOTES
Pt ambulatory to bathroom with assistance for second time. Able to void without difficulty. Lida care performed by patient. Small amount of bleeding noted on bad. Pt tolerated well.

## 2022-05-06 NOTE — PROGRESS NOTES
Patient ambulatory to bathroom with standby assist. Able to void without difficulty. Patient tolerated well. Linens changed. Pt resting in bed. S/O at bedside, baby taken to  nursery.

## 2022-05-06 NOTE — PROGRESS NOTES
Admitted to 302 room from L&D via wheelchair with infant and accompanied by RN from L&D. Oriented to call light at bedside, RN cell number, Doctor's orders, need to call for help with first out of bed to void, infant safety and security, pamphlets at bedside, visitation policy of one overnight visitor over the age of 25, and ordering meals.

## 2022-05-06 NOTE — ANESTHESIA PROCEDURE NOTES
Epidural Block    Patient location during procedure: OB  Start time: 5/6/2022 7:25 PM  End time: 5/6/2022 7:33 PM  Reason for block: labor epidural  Staffing  Performed: anesthesiologist   Anesthesiologist: Daisy Woodard MD  Other anesthesia staff: Jamee Selby RN  Preanesthetic Checklist  Completed: patient identified, IV checked, site marked, risks and benefits discussed, surgical consent, monitors and equipment checked, pre-op evaluation, timeout performed, anesthesia consent given, oxygen available and patient being monitored  Epidural  Patient position: sitting  Prep: Betadine and site prepped and draped  Patient monitoring: cardiac monitor, continuous pulse ox and frequent blood pressure checks  Location: L2-3  Injection technique: KEYANNA air  Provider prep: mask and sterile gloves  Needle  Needle type: Tuohy   Needle gauge: 17 G  Needle length: 3.5 in  Needle insertion depth: 3 cm  Catheter type: none (none used)  Additional Notes  Documented Wet tap for labor epidural on 5/5/22. Postural headache. 19cc autologous taken from right AC vein with sterile technique-injected into epidural space. Patient supine 20 minutes post procedure. Postural headache resolved.

## 2022-05-06 NOTE — LACTATION NOTE
Experienced breastfeeding mother reports baby is latching well and denies nipple/breast pain. Declined need for lactation assistance at this time. Instructed on normal infant behavior in the first 12-24 hrs, benefits of skin to skin and components of safe positioning, encouraged rooming-in and avoidance of pacifier use until breastfeeding is well established. Reviewed latch techniques, positioning, signs of effective milk transfer, waking techniques and the importance of frequent feedings- 8-12 times/ 24 hrs to stimulate/maintain milk production. Knows hand expression and encouraged to express drops of colostrum at start of feeding. Reviewed feeding cues and expected urine/stool output and transition. Encouraged to feed infant as often and for as long as the infant wishes to do so. Reviewed [place] learning dom. Eligible for [facility]-encouraged to call for appt and reviewed program benefits. Gave manual breast pump for home. Offered support and encouraged to call for assistance or concerns.

## 2022-05-06 NOTE — FLOWSHEET NOTE
Patient helped to bathroom. Voided large amount without difficulty. Passed one small clot that broke apart easily. Lida care given and explained. Ice cont. To perineum. Assisted back to bed. Tolerated ambulating well.

## 2022-05-06 NOTE — L&D DELIVERY NOTE
26yo  @ 39+ wks delivered via spontaneous vaginal delivery under epidural anesthesia over no episiotomy a male infant at 0334 weighing pending apgars 9,9. 30 second cord clamp delay performed. Placenta with 3VC intact. Baby bulb suctioned and cord blood and gases obtained. ebl 100cc. Should delivered without difficulty.

## 2022-05-06 NOTE — PROGRESS NOTES
Dr Kim Barclay at the bedside to attempt AROM. Unsuccessful at this time. Instructed to have house officer perform AROM when able. Pt repositioned for comfort, call light within reach.

## 2022-05-06 NOTE — PROGRESS NOTES
Notified anesthesia of pt's c/o neck pain that improves when lays flat but does not go away completely. Pain goes up to an 8-9 when sitting up. Anesthesiologist will check in with patient as soon as possible.

## 2022-05-07 VITALS
BODY MASS INDEX: 30.73 KG/M2 | HEIGHT: 62 IN | HEART RATE: 71 BPM | SYSTOLIC BLOOD PRESSURE: 94 MMHG | WEIGHT: 167 LBS | TEMPERATURE: 98.2 F | DIASTOLIC BLOOD PRESSURE: 52 MMHG | OXYGEN SATURATION: 100 % | RESPIRATION RATE: 18 BRPM

## 2022-05-07 PROBLEM — R94.120 FAILED HEARING SCREENING: Status: ACTIVE | Noted: 2022-05-07

## 2022-05-07 PROCEDURE — 6370000000 HC RX 637 (ALT 250 FOR IP): Performed by: OBSTETRICS & GYNECOLOGY

## 2022-05-07 RX ORDER — IBUPROFEN 800 MG/1
800 TABLET ORAL EVERY 8 HOURS PRN
Qty: 30 TABLET | Refills: 0 | Status: SHIPPED | OUTPATIENT
Start: 2022-05-07

## 2022-05-07 RX ADMIN — DOCUSATE SODIUM 100 MG: 100 CAPSULE, LIQUID FILLED ORAL at 08:49

## 2022-05-07 RX ADMIN — FERROUS SULFATE TAB 325 MG (65 MG ELEMENTAL FE) 325 MG: 325 (65 FE) TAB at 08:49

## 2022-05-07 RX ADMIN — IBUPROFEN 800 MG: 800 TABLET, FILM COATED ORAL at 06:40

## 2022-05-07 ASSESSMENT — PAIN DESCRIPTION - DESCRIPTORS: DESCRIPTORS: ACHING;DISCOMFORT;SORE

## 2022-05-07 ASSESSMENT — PAIN SCALES - GENERAL: PAINLEVEL_OUTOF10: 4

## 2022-05-07 ASSESSMENT — PAIN DESCRIPTION - LOCATION: LOCATION: ABDOMEN;HEAD

## 2022-05-07 NOTE — PROGRESS NOTES
[facility] [place] Hearing screening results were discussed with parent. Infant did not pass hearing screening and will need further evaluation outpatient. Questions answered. Brochure given to parent. Advised to monitor developmental milestones and contact physician for any concerns. Discussed the importance of follow up and reviewed date and time ( if scheduling with [facility]) and / or provided a list of other locations should they wish to have baby tested elsewhere. Reviewed with patient we do not always have reminder calls and to please add this to their calendars. Reviewed to bring baby as calm as possible and let baby stay sleeping if asleep upon arrival to appointment. Parent acknowledged all and understood directions. No further questions at this time but provided office # if they think of anything.      Katheryn Pichardo, AuD

## 2022-05-07 NOTE — LACTATION NOTE
Mom reports baby has been nursing well, supplemented due to fussiness. Encouraged frequent feeds to establish milk supply. Reviewed benefits and safety of skin to skin. Inst on adequate I/O and importance of keeping track of diapers at home. Instructed on signs of dehydration such as infant refusing to feed, decreased wet diapers and infant becoming listless and notify provider if these occur. Reviewed with mom the importance of notifying the physician if baby looks more jaundiced. Lactation office # given if follow-up needed, as well as other helpful resources. Encouraged to call with any concerns. Support and encouragement given.

## 2022-05-07 NOTE — PROGRESS NOTES
Pt resting in bed. Assessment as charted. Discharge planning today. Pt instructed to call with any needs or concerns. Pt voiced understanding.

## 2022-05-07 NOTE — PROGRESS NOTES
Postpartum Day 1: Vaginal Delivery    The patient feels well. Pain is well controlled with current medications. Baby is feeding via breast.     Objective:        Vitals:    05/07/22 0649   BP: (!) 94/52   Pulse: 71   Resp: 18   Temp: 98.2 °F (36.8 °C)   SpO2: 100%         Lab Results   Component Value Date    WBC 7.4 05/05/2022    HGB 7.6 (L) 05/06/2022    HCT 25.7 (L) 05/06/2022    MCV 64.1 (L) 05/05/2022     05/05/2022       General:    alert, appears stated age and cooperative   Lochia:  appropriate   Uterine    firm   DVT Evaluation:  No evidence of DVT seen on physical exam.     Assessment:     Status post Vaginal Delivery. good    Plan:     Continue current care.

## 2022-05-07 NOTE — ANESTHESIA POSTPROCEDURE EVALUATION
Department of Anesthesiology  Postprocedure Note    Patient: Nita Jay  MRN: [id number]  YOB: 1995  Date of evaluation: 5/7/2022  Time:  11:41 AM     Procedure Summary     Date: 05/05/22 Room / Location:     Anesthesia Start: 9286 Anesthesia Stop: 05/06/22 0334    Procedure: Labor Analgesia Diagnosis:     Scheduled Providers:  Responsible Provider: Monna Cancer, DO    Anesthesia Type: epidural ASA Status: 3          Anesthesia Type: No value filed. Kalyan Phase I:      Kalyan Phase II:      Last vitals: Reviewed and per EMR flowsheets. Anesthesia Post Evaluation    Patient location during evaluation: bedside  Patient participation: complete - patient participated  Level of consciousness: awake and alert  Pain score: 3  Airway patency: patent  Nausea & Vomiting: no nausea and no vomiting  Complications: no  Cardiovascular status: blood pressure returned to baseline  Respiratory status: acceptable  Hydration status: euvolemic  Comments: C/O stiff neck when standing. Pt had a documented wet tap and en epidural blood patch, states it was perfect last night but neck hurts when standing and walking.   Encouraged the pt to notify nurse if gets more intense

## 2022-05-07 NOTE — PLAN OF CARE
Problem: Skin/Tissue Integrity  Goal: Absence of new skin breakdown  Description: 1. Monitor for areas of redness and/or skin breakdown  2. Assess vascular access sites hourly  3. Every 4-6 hours minimum:  Change oxygen saturation probe site  4. Every 4-6 hours:  If on nasal continuous positive airway pressure, respiratory therapy assess nares and determine need for appliance change or resting period.   Outcome: Completed     Problem: Vaginal Birth or  Section  Goal: Fetal and maternal status remain reassuring during the birth process  Description:  Birth OB-Pregnancy care plan goal which identifies if the fetal and maternal status remain reassuring during the birth process  Outcome: Completed     Problem: Infection - Adult  Goal: Absence of infection at discharge  Outcome: Completed  Goal: Absence of infection during hospitalization  Outcome: Completed  Goal: Absence of fever/infection during anticipated neutropenic period  Outcome: Completed

## 2022-05-09 ENCOUNTER — HOSPITAL ENCOUNTER (EMERGENCY)
Age: 27
Discharge: HOME | End: 2022-05-09
Attending: EMERGENCY MEDICINE
Payer: COMMERCIAL

## 2022-05-09 ENCOUNTER — ANESTHESIA (OUTPATIENT)
Dept: INPATIENT UNIT | Age: 27
End: 2022-05-09
Payer: COMMERCIAL

## 2022-05-09 ENCOUNTER — APPOINTMENT (OUTPATIENT)
Dept: INPATIENT UNIT | Age: 27
End: 2022-05-09
Payer: COMMERCIAL

## 2022-05-09 ENCOUNTER — APPOINTMENT (OUTPATIENT)
Dept: CT IMAGING | Age: 27
End: 2022-05-09
Payer: COMMERCIAL

## 2022-05-09 ENCOUNTER — ANESTHESIA EVENT (OUTPATIENT)
Dept: INPATIENT UNIT | Age: 27
End: 2022-05-09
Payer: COMMERCIAL

## 2022-05-09 VITALS
DIASTOLIC BLOOD PRESSURE: 84 MMHG | OXYGEN SATURATION: 100 % | SYSTOLIC BLOOD PRESSURE: 128 MMHG | HEIGHT: 62 IN | BODY MASS INDEX: 29.44 KG/M2 | RESPIRATION RATE: 16 BRPM | HEART RATE: 76 BPM | TEMPERATURE: 96.1 F | WEIGHT: 160 LBS

## 2022-05-09 DIAGNOSIS — G97.1 SPINAL HEADACHE: Primary | ICD-10-CM

## 2022-05-09 LAB
ALBUMIN SERPL-MCNC: 3.8 G/DL (ref 3.5–5.2)
ALP BLD-CCNC: 109 U/L (ref 35–104)
ALT SERPL-CCNC: 33 U/L (ref 0–32)
ANION GAP SERPL CALCULATED.3IONS-SCNC: 9 MMOL/L (ref 7–16)
ANISOCYTOSIS: ABNORMAL
AST SERPL-CCNC: 50 U/L (ref 0–31)
BACTERIA: NORMAL /HPF
BASOPHILS ABSOLUTE: 0.03 E9/L (ref 0–0.2)
BASOPHILS RELATIVE PERCENT: 0.4 % (ref 0–2)
BILIRUB SERPL-MCNC: <0.2 MG/DL (ref 0–1.2)
BILIRUBIN URINE: NEGATIVE
BLOOD BANK DISPENSE STATUS: NORMAL
BLOOD BANK DISPENSE STATUS: NORMAL
BLOOD BANK PRODUCT CODE: NORMAL
BLOOD BANK PRODUCT CODE: NORMAL
BLOOD, URINE: ABNORMAL
BPU ID: NORMAL
BPU ID: NORMAL
BUN BLDV-MCNC: 5 MG/DL (ref 6–20)
C-REACTIVE PROTEIN: 3.9 MG/DL (ref 0–0.4)
CALCIUM SERPL-MCNC: 8.9 MG/DL (ref 8.6–10.2)
CHLORIDE BLD-SCNC: 107 MMOL/L (ref 98–107)
CLARITY: CLEAR
CO2: 22 MMOL/L (ref 22–29)
COLOR: ABNORMAL
CREAT SERPL-MCNC: 0.5 MG/DL (ref 0.5–1)
DESCRIPTION BLOOD BANK: NORMAL
DESCRIPTION BLOOD BANK: NORMAL
EOSINOPHILS ABSOLUTE: 0.08 E9/L (ref 0.05–0.5)
EOSINOPHILS RELATIVE PERCENT: 1 % (ref 0–6)
GFR AFRICAN AMERICAN: >60
GFR NON-AFRICAN AMERICAN: >60 ML/MIN/1.73
GLUCOSE BLD-MCNC: 81 MG/DL (ref 74–99)
GLUCOSE URINE: NEGATIVE MG/DL
HCT VFR BLD CALC: 29.6 % (ref 34–48)
HEMOGLOBIN: 8.1 G/DL (ref 11.5–15.5)
IMMATURE GRANULOCYTES #: 0.04 E9/L
IMMATURE GRANULOCYTES %: 0.5 % (ref 0–5)
KETONES, URINE: NEGATIVE MG/DL
LACTIC ACID: 1.5 MMOL/L (ref 0.5–2.2)
LEUKOCYTE ESTERASE, URINE: NEGATIVE
LYMPHOCYTES ABSOLUTE: 2.29 E9/L (ref 1.5–4)
LYMPHOCYTES RELATIVE PERCENT: 27.7 % (ref 20–42)
MCH RBC QN AUTO: 18.6 PG (ref 26–35)
MCHC RBC AUTO-ENTMCNC: 27.4 % (ref 32–34.5)
MCV RBC AUTO: 67.9 FL (ref 80–99.9)
MONOCYTES ABSOLUTE: 0.18 E9/L (ref 0.1–0.95)
MONOCYTES RELATIVE PERCENT: 2.2 % (ref 2–12)
NEUTROPHILS ABSOLUTE: 5.66 E9/L (ref 1.8–7.3)
NEUTROPHILS RELATIVE PERCENT: 68.2 % (ref 43–80)
NITRITE, URINE: NEGATIVE
OVALOCYTES: ABNORMAL
PDW BLD-RTO: 20.7 FL (ref 11.5–15)
PH UA: 6 (ref 5–9)
PLATELET # BLD: 293 E9/L (ref 130–450)
PMV BLD AUTO: 9.5 FL (ref 7–12)
POIKILOCYTES: ABNORMAL
POLYCHROMASIA: ABNORMAL
POTASSIUM REFLEX MAGNESIUM: 3.9 MMOL/L (ref 3.5–5)
PROTEIN UA: NEGATIVE MG/DL
RBC # BLD: 4.36 E12/L (ref 3.5–5.5)
RBC UA: NORMAL /HPF (ref 0–2)
SEDIMENTATION RATE, ERYTHROCYTE: 30 MM/HR (ref 0–20)
SODIUM BLD-SCNC: 138 MMOL/L (ref 132–146)
SPECIFIC GRAVITY UA: 1.01 (ref 1–1.03)
TOTAL PROTEIN: 7 G/DL (ref 6.4–8.3)
UROBILINOGEN, URINE: 0.2 E.U./DL
WBC # BLD: 8.3 E9/L (ref 4.5–11.5)
WBC UA: NORMAL /HPF (ref 0–5)

## 2022-05-09 PROCEDURE — 7100000011 HC PHASE II RECOVERY - ADDTL 15 MIN

## 2022-05-09 PROCEDURE — 86140 C-REACTIVE PROTEIN: CPT

## 2022-05-09 PROCEDURE — 7100000010 HC PHASE II RECOVERY - FIRST 15 MIN

## 2022-05-09 PROCEDURE — 62273 INJECT EPIDURAL PATCH: CPT | Performed by: ANESTHESIOLOGY

## 2022-05-09 PROCEDURE — 70450 CT HEAD/BRAIN W/O DYE: CPT

## 2022-05-09 PROCEDURE — 6370000000 HC RX 637 (ALT 250 FOR IP): Performed by: STUDENT IN AN ORGANIZED HEALTH CARE EDUCATION/TRAINING PROGRAM

## 2022-05-09 PROCEDURE — 85025 COMPLETE CBC W/AUTO DIFF WBC: CPT

## 2022-05-09 PROCEDURE — 96375 TX/PRO/DX INJ NEW DRUG ADDON: CPT

## 2022-05-09 PROCEDURE — 96374 THER/PROPH/DIAG INJ IV PUSH: CPT

## 2022-05-09 PROCEDURE — 80053 COMPREHEN METABOLIC PANEL: CPT

## 2022-05-09 PROCEDURE — 6360000002 HC RX W HCPCS: Performed by: STUDENT IN AN ORGANIZED HEALTH CARE EDUCATION/TRAINING PROGRAM

## 2022-05-09 PROCEDURE — 99284 EMERGENCY DEPT VISIT MOD MDM: CPT

## 2022-05-09 PROCEDURE — 81001 URINALYSIS AUTO W/SCOPE: CPT

## 2022-05-09 PROCEDURE — 85651 RBC SED RATE NONAUTOMATED: CPT

## 2022-05-09 PROCEDURE — 83605 ASSAY OF LACTIC ACID: CPT

## 2022-05-09 RX ORDER — CAFFEINE 200 MG
200 TABLET ORAL ONCE
Status: COMPLETED | OUTPATIENT
Start: 2022-05-09 | End: 2022-05-09

## 2022-05-09 RX ORDER — 0.9 % SODIUM CHLORIDE 0.9 %
1000 INTRAVENOUS SOLUTION INTRAVENOUS ONCE
Status: DISCONTINUED | OUTPATIENT
Start: 2022-05-09 | End: 2022-05-09 | Stop reason: HOSPADM

## 2022-05-09 RX ORDER — METOCLOPRAMIDE HYDROCHLORIDE 5 MG/ML
10 INJECTION INTRAMUSCULAR; INTRAVENOUS ONCE
Status: COMPLETED | OUTPATIENT
Start: 2022-05-09 | End: 2022-05-09

## 2022-05-09 RX ORDER — DIPHENHYDRAMINE HYDROCHLORIDE 50 MG/ML
25 INJECTION INTRAMUSCULAR; INTRAVENOUS ONCE
Status: COMPLETED | OUTPATIENT
Start: 2022-05-09 | End: 2022-05-09

## 2022-05-09 RX ADMIN — DIPHENHYDRAMINE HYDROCHLORIDE 25 MG: 50 INJECTION, SOLUTION INTRAMUSCULAR; INTRAVENOUS at 14:10

## 2022-05-09 RX ADMIN — METOCLOPRAMIDE HYDROCHLORIDE 10 MG: 5 INJECTION INTRAMUSCULAR; INTRAVENOUS at 14:10

## 2022-05-09 RX ADMIN — CAFFEINE 200 MG: 200 TABLET ORAL at 14:10

## 2022-05-09 ASSESSMENT — ENCOUNTER SYMPTOMS
VOMITING: 0
NAUSEA: 0
SINUS PRESSURE: 0
EYE REDNESS: 0
EYE PAIN: 0
SORE THROAT: 0
ABDOMINAL DISTENTION: 0
WHEEZING: 0
BACK PAIN: 0
EYE DISCHARGE: 0
SHORTNESS OF BREATH: 0
DIARRHEA: 0
COUGH: 0

## 2022-05-09 ASSESSMENT — PAIN DESCRIPTION - ORIENTATION
ORIENTATION: MID
ORIENTATION: MID

## 2022-05-09 ASSESSMENT — PAIN SCALES - GENERAL
PAINLEVEL_OUTOF10: 4
PAINLEVEL_OUTOF10: 4

## 2022-05-09 ASSESSMENT — PAIN DESCRIPTION - DESCRIPTORS
DESCRIPTORS: DULL;ACHING
DESCRIPTORS: POUNDING;THROBBING
DESCRIPTORS: POUNDING

## 2022-05-09 ASSESSMENT — PAIN DESCRIPTION - LOCATION
LOCATION: HEAD
LOCATION: HEAD

## 2022-05-09 ASSESSMENT — PAIN - FUNCTIONAL ASSESSMENT
PAIN_FUNCTIONAL_ASSESSMENT: NONE - DENIES PAIN
PAIN_FUNCTIONAL_ASSESSMENT: 0-10

## 2022-05-09 ASSESSMENT — PAIN DESCRIPTION - PAIN TYPE: TYPE: ACUTE PAIN

## 2022-05-09 NOTE — PROGRESS NOTES
1550 time out performed for blood patch. Procedure began. 1600 procedure ended. Vs stable. Patient tolerated procedure well. Laying supine. Pain 4/10 at this. Will continue to monitor.

## 2022-05-09 NOTE — ANESTHESIA PROCEDURE NOTES
Epidural Blood Patch    Patient location during procedure: procedural area  Staffing  Performed: anesthesiologist   Anesthesiologist: Bart Cardona MD  Preanesthetic Checklist  Completed: patient identified, IV checked, site marked, risks and benefits discussed, surgical consent, monitors and equipment checked, pre-op evaluation, timeout performed, anesthesia consent given, oxygen available and patient being monitored  Epidural  Patient position: sitting  Prep: ChloraPrep  Patient monitoring: cardiac monitor, continuous pulse ox and frequent blood pressure checks  Approach: midline  Injection technique: KEYANNA air  Provider prep: mask and sterile gloves  Needle  Needle type: Tuohy   Needle gauge: 18 G  Needle length: 3.5 in  Needle insertion depth: 4 cm  Assessment  Attempts: 1

## 2022-05-09 NOTE — ED PROVIDER NOTES
[facility]  Department of Emergency Medicine     Written by: Ramon Smiley DO  Patient Name: Cocolalla Yelena  Attending Provider: Chastity Fuentes MD  Admit Date: 2022  1:33 PM  MRN: [id number]                   : 1995        Chief Complaint   Patient presents with    Headache     delivered baby on friday with epidural     Neck Pain    - Chief complaint    Patient is a 40-year-old female past medical history of bipolar type I, depression, thrombophilia affecting pregnancy. Patient presents with chief complaint of headache. Patient states that she had an epidural done prior to her delivery and has a headache since then. Patient states that the headache is a dull generalized aching sensation. She notes that anesthesia did perform a blood patch a day after her epidural with initially some improvement. However patient notes that over the last couple of days patient's had gradually worsening headache. She states that the headache is a dull aching generalized sensation currently rates pain 5 out of 10. Patient denies any exacerbating relieving factors. Stated symptoms have been constant since onset. Patient denies similar episodes in the past.  Patient has any fevers, chills, nausea, vomiting, chest pain, cough or shortness of breath. The history is provided by the patient. No  was used. Review of Systems   Constitutional: Negative for chills and fever. HENT: Negative for ear pain, sinus pressure and sore throat. Eyes: Negative for pain, discharge and redness. Respiratory: Negative for cough, shortness of breath and wheezing. Cardiovascular: Negative for chest pain. Gastrointestinal: Negative for abdominal distention, diarrhea, nausea and vomiting. Genitourinary: Negative for dysuria and frequency. Musculoskeletal: Positive for neck stiffness. Negative for arthralgias and back pain. Skin: Negative for rash and wound. Neurological: Positive for headaches. Negative for weakness. Hematological: Negative for adenopathy. All other systems reviewed and are negative. Physical Exam  Vitals and nursing note reviewed. Constitutional:       General: She is not in acute distress. Appearance: Normal appearance. HENT:      Head: Normocephalic and atraumatic. Nose: No congestion or rhinorrhea. Mouth/Throat:      Mouth: Mucous membranes are moist.      Pharynx: Oropharynx is clear. Eyes:      Extraocular Movements: Extraocular movements intact. Pupils: Pupils are equal, round, and reactive to light. Neck:      Comments: Mild neck stiffness no nuchal rigidity. Cardiovascular:      Rate and Rhythm: Normal rate and regular rhythm. Heart sounds: No murmur heard. No gallop. Pulmonary:      Effort: Pulmonary effort is normal. No respiratory distress. Breath sounds: No wheezing, rhonchi or rales. Abdominal:      General: Abdomen is flat. Palpations: Abdomen is soft. There is no mass. Tenderness: There is no abdominal tenderness. There is no guarding. Hernia: No hernia is present. Musculoskeletal:         General: No swelling or signs of injury. Normal range of motion. Cervical back: Normal range of motion. Tenderness present. No muscular tenderness. Skin:     General: Skin is warm and dry. Capillary Refill: Capillary refill takes less than 2 seconds. Neurological:      General: No focal deficit present. Mental Status: She is alert and oriented to person, place, and time. Mental status is at baseline. Comments: Muscle strength 5 out of 5 to bilateral upper and lower extremities, sensation intact light touch.   Pupils equal round reactive to light, no ataxia noted   Psychiatric:         Mood and Affect: Mood normal.         Behavior: Behavior normal.          Procedures       MDM  Number of Diagnoses or Management Options  Spinal headache  Diagnosis management comments: Patient is a 67-year-old female past med history bipolar type I, depression, thrombophilia compliancy. Patient with chief complaint of headache and neck pain after epidural.  Patient did receive previous spinal patch. Vital signs stable presentation. Physical exam heart regular rate and rhythm, lungs clear to auscultation bilaterally, abdomen soft nontender no rigidity rebound or guarding. On neuro exam minimal neck stiffness no nuchal rigidity no meningeal signs, muscle strength 5 out of 5 to bilateral upper and lower extremities, sensation intact to light touch, pupils equal round reactive to light, extraocular eye movements intact. Laboratory work obtained CBC demonstrate no acute maladies, CMP demonstrate no acute abnormalities. Laboratory markers minimally elevated secondary to recent birth. Lactic acid 1.5, urinalysis not indicative infection no protein present. On review of charts patient did have a wet tap during epidural.  CT scan of the head obtained demonstrate no acute maladies, patient given Reglan, Benadryl and caffeine. Call placed to anesthesia for repeat blood patch. Patient underwent repeat blood patch with significant provement headache. Find consistent with spinal headache. Decision made to discharge patient. Patient is instructed to increase fluids and to follow-up with primary care doctor soon as possible. In addition patient notes any new or worrisome symptoms she was instructed to return to the emergency department for evaluation.   Plan of care discussed with patient occluding discharge, all questions were answered, patient was in agreement plan of care discharged home in stable condition peer       Amount and/or Complexity of Data Reviewed  Clinical lab tests: ordered and reviewed  Tests in the radiology section of CPT®: ordered and reviewed  Decide to obtain previous medical records or to obtain history from someone other than the patient: yes    Risk of Complications, Morbidity, and/or Mortality  Presenting problems: moderate  Diagnostic procedures: moderate  Management options: moderate    Patient Progress  Patient progress: stable             --------------------------------------------- PAST HISTORY ---------------------------------------------  Past Medical History:  has a past medical history of Allergic rhinitis, Asthma, Bipolar 1 disorder ([facility] [address] Concussion, Depression, Neck strain, Panic attack, Pre-eclampsia, and Thrombophilia complicating pregnancy, antepartum ([facility] Past Surgical History:  has a past surgical history that includes Tonsillectomy and [place] tooth extraction. Social History:  reports that she quit smoking about 4 years ago. Her smoking use included cigarettes. She has a 3.00 pack-year smoking history. She has never used smokeless tobacco. She reports previous alcohol use. She reports that she does not use drugs. Family History: family history includes Depression in her brother and mother; Diabetes in her maternal grandmother, paternal grandmother, and son. The patients home medications have been reviewed.     Allergies: Zyrtec [cetirizine]    -------------------------------------------------- RESULTS -------------------------------------------------  Labs:  Results for orders placed or performed during the hospital encounter of 05/09/22   CBC with Auto Differential   Result Value Ref Range    WBC 8.3 4.5 - 11.5 E9/L    RBC 4.36 3.50 - 5.50 E12/L    Hemoglobin 8.1 (L) 11.5 - 15.5 g/dL    Hematocrit 29.6 (L) 34.0 - 48.0 %    MCV 67.9 (L) 80.0 - 99.9 fL    MCH 18.6 (L) 26.0 - 35.0 pg    MCHC 27.4 (L) 32.0 - 34.5 %    RDW 20.7 (H) 11.5 - 15.0 fL    Platelets 039 019 - 959 E9/L    MPV 9.5 7.0 - 12.0 fL    Neutrophils % 68.2 43.0 - 80.0 %    Immature Granulocytes % 0.5 0.0 - 5.0 %    Lymphocytes % 27.7 20.0 - 42.0 %    Monocytes % 2.2 2.0 - 12.0 %    Eosinophils % 1.0 0.0 - 6.0 %    Basophils % 0.4 0.0 - 2.0 %    Neutrophils Absolute 5. 66 1.80 - 7.30 E9/L    Immature Granulocytes # 0.04 E9/L    Lymphocytes Absolute 2.29 1.50 - 4.00 E9/L    Monocytes Absolute 0.18 0.10 - 0.95 E9/L    Eosinophils Absolute 0.08 0.05 - 0.50 E9/L    Basophils Absolute 0.03 0.00 - 0.20 E9/L    Anisocytosis 2+     Polychromasia 1+     Poikilocytes 1+     Ovalocytes 1+    Comprehensive Metabolic Panel w/ Reflex to MG   Result Value Ref Range    Sodium 138 132 - 146 mmol/L    Potassium reflex Magnesium 3.9 3.5 - 5.0 mmol/L    Chloride 107 98 - 107 mmol/L    CO2 22 22 - 29 mmol/L    Anion Gap 9 7 - 16 mmol/L    Glucose 81 74 - 99 mg/dL    BUN 5 (L) 6 - 20 mg/dL    CREATININE 0.5 0.5 - 1.0 mg/dL    GFR Non-African American >60 >=60 mL/min/1.73    GFR African American >60     Calcium 8.9 8.6 - 10.2 mg/dL    Total Protein 7.0 6.4 - 8.3 g/dL    Albumin 3.8 3.5 - 5.2 g/dL    Total Bilirubin <0.2 0.0 - 1.2 mg/dL    Alkaline Phosphatase 109 (H) 35 - 104 U/L    ALT 33 (H) 0 - 32 U/L    AST 50 (H) 0 - 31 U/L   Lactic Acid   Result Value Ref Range    Lactic Acid 1.5 0.5 - 2.2 mmol/L   Urinalysis   Result Value Ref Range    Color, UA Straw Straw/Yellow    Clarity, UA Clear Clear    Glucose, Ur Negative Negative mg/dL    Bilirubin Urine Negative Negative    Ketones, Urine Negative Negative mg/dL    Specific Gravity, UA 1.010 1.005 - 1.030    Blood, Urine MODERATE (A) Negative    pH, UA 6.0 5.0 - 9.0    Protein, UA Negative Negative mg/dL    Urobilinogen, Urine 0.2 <2.0 E.U./dL    Nitrite, Urine Negative Negative    Leukocyte Esterase, Urine Negative Negative   C-Reactive Protein   Result Value Ref Range    CRP 3.9 (H) 0.0 - 0.4 mg/dL   Microscopic Urinalysis   Result Value Ref Range    WBC, UA 0-1 0 - 5 /HPF    RBC, UA NONE 0 - 2 /HPF    Bacteria, UA NONE SEEN None Seen /HPF       Radiology:  CT Head WO Contrast   Final Result   No acute intracranial abnormality.             ------------------------- NURSING NOTES AND VITALS REVIEWED ---------------------------  Date / Time Roomed: 5/9/2022  1:33 PM  ED Bed Assignment:  11/11    The nursing notes within the ED encounter and vital signs as below have been reviewed. /89   Pulse 72   Temp 96.4 °F (35.8 °C) (Oral)   Resp 14   Ht 5' 2\" (1.575 m)   Wt 160 lb (72.6 kg)   LMP 08/05/2021   SpO2 100%   BMI 29.26 kg/m²   Oxygen Saturation Interpretation: Normal      ------------------------------------------ PROGRESS NOTES ------------------------------------------  4:57 PM EDT  I have spoken with the patient and discussed todays results, in addition to providing specific details for the plan of care and counseling regarding the diagnosis and prognosis. Their questions are answered at this time and they are agreeable with the plan. I discussed at length with them reasons for immediate return here for re evaluation. They will followup with their primary care physician by calling their office tomorrow. --------------------------------- ADDITIONAL PROVIDER NOTES ---------------------------------  At this time the patient is without objective evidence of an acute process requiring hospitalization or inpatient management. They have remained hemodynamically stable throughout their entire ED visit and are stable for discharge with outpatient follow-up. The plan has been discussed in detail and they are aware of the specific conditions for emergent return, as well as the importance of follow-up. New Prescriptions    No medications on file       Diagnosis:  1. Spinal headache        Disposition:  Patient's disposition: Discharge to home  Patient's condition is stable. Patient was seen and evaluated by myself and my attending Lino Yañez MD. Assessment and Plan discussed with attending provider, please see attestation for final plan of care.      DO Janay Suarez DO  Resident  05/09/22 7920

## 2022-05-09 NOTE — ED PROVIDER NOTES
FIRST PROVIDER CONTACT ASSESSMENT NOTE           Department of Emergency Medicine                 First Provider Note            22  11:30 AM EDT    Date of Encounter: No admission date for patient encounter. Patient Name: Bill Reynolds  : 1995  MRN: [id number]    Chief Complaint: Headache (delivered baby on friday with epidural )      History of Present Illness:   Bill Reynolds is a 31 y.o. female who presents to the ED for neck pain and HA. Had epidural on Friday. Pain is severe and constant. Focused Physical Exam:  VS:    ED Triage Vitals   BP Temp Temp src Pulse Resp SpO2 Height Weight   -- -- -- -- -- -- -- --        Physical Ex: Constitutional: Alert and non-toxic. Medical History:  has a past medical history of Allergic rhinitis, Asthma, Bipolar 1 disorder ([address] Concussion, Depression, Neck strain, Panic attack, Pre-eclampsia, and Thrombophilia complicating pregnancy, antepartum ([address] Surgical History:  has a past surgical history that includes Tonsillectomy and [place] tooth extraction. Social History:  reports that she quit smoking about 4 years ago. Her smoking use included cigarettes. She has a 3.00 pack-year smoking history. She has never used smokeless tobacco. She reports previous alcohol use. She reports that she does not use drugs. Family History: family history includes Depression in her brother and mother; Diabetes in her maternal grandmother, paternal grandmother, and son.     Allergies: Zyrtec [cetirizine]     Initial Plan of Care: Initiate Treatment-Testing, Proceed toTreatment Area When Bed Available for ED Attending/MLP to Continue Care      ---END OF FIRST PROVIDER CONTACT ASSESSMENT NOTE---  Electronically signed by Luis Felipe Miguel PA-C   DD: 22        Luis Felipe Miguel PA-C  [phone]

## 2022-05-09 NOTE — PROGRESS NOTES
High fowlers pain 4/10. States more tolerable and able to move her neck.  Ok to transfer back to ER per Hope Dawley

## 2022-05-09 NOTE — ED NOTES
Pt states that she had an epidural 3 days ago, since then she had been having severe headaches xochitl to her neck and back of her head, she states that she is not having any blurred vision, no vomitting no nausea, she is alert times 3, resp easy, lungs clear a and p, abdomen is soft with bowel sounds times 4, 0 edema      Nino Bee RN  05/09/22 0750

## 2022-10-31 ENCOUNTER — OFFICE VISIT (OUTPATIENT)
Dept: PRIMARY CARE CLINIC | Age: 27
End: 2022-10-31
Payer: COMMERCIAL

## 2022-10-31 VITALS
WEIGHT: 163 LBS | HEART RATE: 91 BPM | BODY MASS INDEX: 28.87 KG/M2 | SYSTOLIC BLOOD PRESSURE: 124 MMHG | TEMPERATURE: 97.9 F | OXYGEN SATURATION: 99 % | DIASTOLIC BLOOD PRESSURE: 70 MMHG

## 2022-10-31 DIAGNOSIS — M54.6 CHRONIC RIGHT-SIDED THORACIC BACK PAIN: ICD-10-CM

## 2022-10-31 DIAGNOSIS — G89.29 CHRONIC RIGHT-SIDED THORACIC BACK PAIN: ICD-10-CM

## 2022-10-31 DIAGNOSIS — R07.89 MUSCULAR CHEST PAIN: ICD-10-CM

## 2022-10-31 DIAGNOSIS — M54.6 ACUTE LEFT-SIDED THORACIC BACK PAIN: Primary | ICD-10-CM

## 2022-10-31 PROCEDURE — G8484 FLU IMMUNIZE NO ADMIN: HCPCS | Performed by: NURSE PRACTITIONER

## 2022-10-31 PROCEDURE — 99213 OFFICE O/P EST LOW 20 MIN: CPT | Performed by: NURSE PRACTITIONER

## 2022-10-31 PROCEDURE — G8419 CALC BMI OUT NRM PARAM NOF/U: HCPCS | Performed by: NURSE PRACTITIONER

## 2022-10-31 PROCEDURE — G8427 DOCREV CUR MEDS BY ELIG CLIN: HCPCS | Performed by: NURSE PRACTITIONER

## 2022-10-31 PROCEDURE — 1036F TOBACCO NON-USER: CPT | Performed by: NURSE PRACTITIONER

## 2022-10-31 RX ORDER — PREDNISONE 20 MG/1
20 TABLET ORAL 2 TIMES DAILY
Qty: 10 TABLET | Refills: 0 | Status: SHIPPED | OUTPATIENT
Start: 2022-10-31 | End: 2022-11-05

## 2022-10-31 RX ORDER — TIZANIDINE 4 MG/1
4 TABLET ORAL EVERY 8 HOURS PRN
Qty: 30 TABLET | Refills: 0 | Status: SHIPPED | OUTPATIENT
Start: 2022-10-31

## 2022-10-31 ASSESSMENT — PATIENT HEALTH QUESTIONNAIRE - PHQ9
SUM OF ALL RESPONSES TO PHQ QUESTIONS 1-9: 0
4. FEELING TIRED OR HAVING LITTLE ENERGY: 0
10. IF YOU CHECKED OFF ANY PROBLEMS, HOW DIFFICULT HAVE THESE PROBLEMS MADE IT FOR YOU TO DO YOUR WORK, TAKE CARE OF THINGS AT HOME, OR GET ALONG WITH OTHER PEOPLE: 0
2. FEELING DOWN, DEPRESSED OR HOPELESS: 0
6. FEELING BAD ABOUT YOURSELF - OR THAT YOU ARE A FAILURE OR HAVE LET YOURSELF OR YOUR FAMILY DOWN: 0
8. MOVING OR SPEAKING SO SLOWLY THAT OTHER PEOPLE COULD HAVE NOTICED. OR THE OPPOSITE, BEING SO FIGETY OR RESTLESS THAT YOU HAVE BEEN MOVING AROUND A LOT MORE THAN USUAL: 0
3. TROUBLE FALLING OR STAYING ASLEEP: 0
SUM OF ALL RESPONSES TO PHQ QUESTIONS 1-9: 0
SUM OF ALL RESPONSES TO PHQ QUESTIONS 1-9: 0
7. TROUBLE CONCENTRATING ON THINGS, SUCH AS READING THE NEWSPAPER OR WATCHING TELEVISION: 0
1. LITTLE INTEREST OR PLEASURE IN DOING THINGS: 0
SUM OF ALL RESPONSES TO PHQ QUESTIONS 1-9: 0
5. POOR APPETITE OR OVEREATING: 0
9. THOUGHTS THAT YOU WOULD BE BETTER OFF DEAD, OR OF HURTING YOURSELF: 0
SUM OF ALL RESPONSES TO PHQ9 QUESTIONS 1 & 2: 0
SUM OF ALL RESPONSES TO PHQ QUESTIONS 1-9: 0
SUM OF ALL RESPONSES TO PHQ QUESTIONS 1-9: 0
2. FEELING DOWN, DEPRESSED OR HOPELESS: 0
SUM OF ALL RESPONSES TO PHQ QUESTIONS 1-9: 0
SUM OF ALL RESPONSES TO PHQ9 QUESTIONS 1 & 2: 0
SUM OF ALL RESPONSES TO PHQ QUESTIONS 1-9: 0
1. LITTLE INTEREST OR PLEASURE IN DOING THINGS: 0

## 2022-10-31 NOTE — PROGRESS NOTES
Chief Complaint:   Back Pain (Mid to low back pain  - no known injury    on and off for years, gotten worse lately   no urinary issues )      History of Present Illness   Source of history provided by:  patient. Albaro Gilbert is a 28 y.o. old female who presents to the walk in clinic for evaluation of Right thoracic back pain for the past 2 days . Pt denies any known injury. Pt has  had back problems in the past.  Pt states the pain is worse with movement and improves with lying flat. There is right  radiation of the pain into the buttocks/leg. Pt denies any bowel/bladder incontinence, abdominal pain, hematuria, N/V/D, fever, chills, HA, neck pain, recent illness, dysuria, or lethargy. Review of Systems    Unless otherwise stated in this report or unable to obtain because of the patient's clinical or mental status as evidenced by the medical record, this patients's positive and negative responses for Review of Systems, constitutional, psych, eyes, ENT, cardiovascular, respiratory, gastrointestinal, neurological, genitourinary, musculoskeletal, integument systems and systems related to the presenting problem are either stated in the preceding or were not pertinent or were negative for the symptoms and/or complaints related to the medical problem. Past Medical History:  has a past medical history of Allergic rhinitis, Asthma, Bipolar 1 disorder ([address] Concussion, Depression, Neck strain, Panic attack, Pre-eclampsia, and Thrombophilia complicating pregnancy, antepartum ([address] Past Surgical History:  has a past surgical history that includes Tonsillectomy and [place] tooth extraction. Social History:  reports that she quit smoking about 4 years ago. Her smoking use included cigarettes. She has a 3.00 pack-year smoking history. She has never used smokeless tobacco. She reports that she does not currently use alcohol. She reports that she does not use drugs.   Family History: family history includes Depression in her brother and mother; Diabetes in her maternal grandmother, paternal grandmother, and son. Allergies: Zyrtec [cetirizine]    Physical Exam   Vital Signs:  /70 (Site: Left Upper Arm, Position: Sitting, Cuff Size: Large Adult)   Pulse 91   Temp 97.9 °F (36.6 °C) (Temporal)   Wt 163 lb (73.9 kg)   SpO2 99%   BMI 28.87 kg/m²    Oxygen Saturation Interpretation: Normal.    Constitutional:  Alert, development consistent with age. Neck:  Normal ROM. Supple. No TTP. Lungs:  Clear to auscultation and breath sounds equal.  Heart:  Regular rate and rhythm, normal heart sounds, without pathological murmurs, ectopy, gallops, or rubs. Abdomen:  Soft, nontender, good bowel sounds. No firm or pulsatile mass. Back: Tenderness: TTP over thoracic right region with no appreciable midline tenderness. Swelling: No edema. Range of Motion: Decreased lateral and front to back ROM due to pain. CVA Tenderness: No CVA tenderness bilaterally. Straight leg raising:  neg straight leg raise. Skin:  No bruising, redness, abrasions, or rashes. Distal Function:              Motor deficit: Strength 5/5 in LE's bilaterally. Sensory deficit: Distal sensation intact. Pulse deficit: Distal pulses 2+ and bounding. Calf Tenderness:  No bilateral calf tenderness. Negative Abdirashid's sign bilaterally               Reflexes: Intact at knee and achilles bilaterally. Gait:  No antalgia noted. Skin:  Normal turgor. Warm, dry, without visible rash. Neurological:  Alert and oriented. Motor functions intact. Test Results Section   (All laboratory and radiology results have been personally reviewed by myself)  Labs:  No results found for this visit on 10/31/22. Imaging: All Radiology results interpreted by Radiologist unless otherwise noted.     Assessment / Plan   Impression(s):  Juan Tejada was seen today for back pain.    Diagnoses and all orders for this visit:    Acute left-sided thoracic back pain  -     XR THORACIC SPINE (3 VIEWS); Future    Muscular chest pain  -     XR THORACIC SPINE (3 VIEWS); Future    Chronic right-sided thoracic back pain    Other orders  -     predniSONE (DELTASONE) 20 MG tablet; Take 1 tablet by mouth 2 times daily for 5 days  -     tiZANidine (ZANAFLEX) 4 MG tablet; Take 1 tablet by mouth every 8 hours as needed (spasm)       side effects discussed avoid breast feeding on these medications. Advise rest, ice, and/or moist heat for additional relief. PCP in 1-2 weeks if symptoms persist. ED sooner if symptoms worsen or change. ED immediately with any fever, severe or worsening back pain, paresthesias, weakness, or GI/ incontinence. Pt states understanding and is in agreement with this care plan. All questions answered. Return if symptoms worsen or fail to improve.        ABBIE Brizuela - CNP

## 2022-11-01 ENCOUNTER — TELEPHONE (OUTPATIENT)
Dept: PRIMARY CARE CLINIC | Age: 27
End: 2022-11-01

## 2022-11-01 DIAGNOSIS — R07.89 MUSCULAR CHEST PAIN: ICD-10-CM

## 2022-11-01 DIAGNOSIS — M54.6 ACUTE LEFT-SIDED THORACIC BACK PAIN: Primary | ICD-10-CM

## 2022-11-01 DIAGNOSIS — M54.6 CHRONIC RIGHT-SIDED THORACIC BACK PAIN: ICD-10-CM

## 2022-11-01 DIAGNOSIS — G89.29 CHRONIC RIGHT-SIDED THORACIC BACK PAIN: ICD-10-CM

## 2023-01-20 ENCOUNTER — TELEPHONE (OUTPATIENT)
Dept: PRIMARY CARE CLINIC | Age: 28
End: 2023-01-20

## 2023-01-20 NOTE — TELEPHONE ENCOUNTER
Patient called and states she possibly brown clogged milk ducts that's persistent, she is fully breastfeeding, no pumping. She is 8 months postpartum. She is wondering what she could do and if vanesa has any suggestions. She states Jared Dee is her pcp however I informed patient patient that she's not Vanesa patient as she no showed her appointment in December for new patient. She states she never got a call but would like to reschedule however since [facility] had sooner available appointments she wanted to see him. Patient is scheduled. Advised patient to call obgyn and see what can be done and she verbalized understanding and states she will do that.

## 2023-12-26 NOTE — H&P
07/30/19 2340   BP:   119/74 (!) 112/55   Pulse:   78 67   Resp:   16 18   Temp:   98.4 °F (36.9 °C)    TempSrc:   Oral    SpO2: 94% 95%     Weight:       Height:         General appearance:  awake, alert, cooperative, no apparent distress, and appears stated age  Neurologic:  Awake, alert, oriented to name, place and time. Lungs:  No increased work of breathing, good air exchange  Abdomen:  Soft, non tender, gravid, consistent with her gestational age   Fetal heart rate:  Reassuring.   Pelvis:  Adequate pelvis  Cervix: Closed 50% medium -2  Contraction frequency:  0 minutes    Membranes:  Intact    ASSESSMENT AND PLAN:    Labor: admit  Fetus: Reassuring  Other: cervidil      Electronically signed by Omar Plummer DO on 7/31/2019 at 2:08 AM
279.9